# Patient Record
Sex: FEMALE | Race: WHITE | NOT HISPANIC OR LATINO | Employment: OTHER | URBAN - METROPOLITAN AREA
[De-identification: names, ages, dates, MRNs, and addresses within clinical notes are randomized per-mention and may not be internally consistent; named-entity substitution may affect disease eponyms.]

---

## 2017-06-02 ENCOUNTER — TRANSCRIBE ORDERS (OUTPATIENT)
Dept: ADMINISTRATIVE | Facility: HOSPITAL | Age: 67
End: 2017-06-02

## 2017-06-02 DIAGNOSIS — Z12.31 VISIT FOR SCREENING MAMMOGRAM: Primary | ICD-10-CM

## 2017-07-27 ENCOUNTER — HOSPITAL ENCOUNTER (OUTPATIENT)
Dept: MAMMOGRAPHY | Facility: HOSPITAL | Age: 67
Discharge: HOME | End: 2017-07-27
Attending: OBSTETRICS & GYNECOLOGY | Admitting: OBSTETRICS & GYNECOLOGY

## 2017-07-27 DIAGNOSIS — Z12.31 VISIT FOR SCREENING MAMMOGRAM: ICD-10-CM

## 2017-07-27 PROCEDURE — 77063 BREAST TOMOSYNTHESIS BI: CPT | Performed by: RADIOLOGY

## 2017-07-27 PROCEDURE — G0202 SCR MAMMO BI INCL CAD: HCPCS | Performed by: RADIOLOGY

## 2017-07-27 PROCEDURE — G0202 SCR MAMMO BI INCL CAD: HCPCS

## 2017-07-27 PROCEDURE — 77063 BREAST TOMOSYNTHESIS BI: CPT

## 2017-07-28 ENCOUNTER — APPOINTMENT (OUTPATIENT)
Dept: MAMMOGRAPHY | Facility: HOSPITAL | Age: 67
End: 2017-07-28
Attending: OBSTETRICS & GYNECOLOGY

## 2018-08-06 ENCOUNTER — TRANSCRIBE ORDERS (OUTPATIENT)
Dept: ADMINISTRATIVE | Facility: HOSPITAL | Age: 68
End: 2018-08-06

## 2018-08-06 DIAGNOSIS — Z12.31 VISIT FOR SCREENING MAMMOGRAM: Primary | ICD-10-CM

## 2018-09-11 ENCOUNTER — HOSPITAL ENCOUNTER (OUTPATIENT)
Dept: MAMMOGRAPHY | Facility: HOSPITAL | Age: 68
Discharge: HOME | End: 2018-09-11
Attending: OBSTETRICS & GYNECOLOGY | Admitting: OBSTETRICS & GYNECOLOGY

## 2018-09-11 DIAGNOSIS — Z12.31 VISIT FOR SCREENING MAMMOGRAM: ICD-10-CM

## 2018-09-11 PROCEDURE — 77063 BREAST TOMOSYNTHESIS BI: CPT | Performed by: RADIOLOGY

## 2018-09-11 PROCEDURE — 77067 SCR MAMMO BI INCL CAD: CPT

## 2018-09-11 PROCEDURE — 77063 BREAST TOMOSYNTHESIS BI: CPT

## 2018-09-11 PROCEDURE — 77067 SCR MAMMO BI INCL CAD: CPT | Performed by: RADIOLOGY

## 2019-08-07 ENCOUNTER — TRANSCRIBE ORDERS (OUTPATIENT)
Dept: ADMINISTRATIVE | Facility: HOSPITAL | Age: 69
End: 2019-08-07

## 2019-08-07 DIAGNOSIS — Z12.31 VISIT FOR SCREENING MAMMOGRAM: Primary | ICD-10-CM

## 2019-09-23 ENCOUNTER — HOSPITAL ENCOUNTER (OUTPATIENT)
Dept: MAMMOGRAPHY | Facility: HOSPITAL | Age: 69
Discharge: HOME | End: 2019-09-23
Admitting: OBSTETRICS & GYNECOLOGY

## 2019-09-23 DIAGNOSIS — Z12.31 VISIT FOR SCREENING MAMMOGRAM: ICD-10-CM

## 2019-09-23 PROCEDURE — 77063 BREAST TOMOSYNTHESIS BI: CPT

## 2019-09-23 PROCEDURE — 77067 SCR MAMMO BI INCL CAD: CPT

## 2019-09-23 PROCEDURE — 77063 BREAST TOMOSYNTHESIS BI: CPT | Performed by: RADIOLOGY

## 2019-09-23 PROCEDURE — 77067 SCR MAMMO BI INCL CAD: CPT | Performed by: RADIOLOGY

## 2019-10-21 ENCOUNTER — TRANSCRIBE ORDERS (OUTPATIENT)
Dept: MAMMOGRAPHY | Facility: HOSPITAL | Age: 69
End: 2019-10-21

## 2019-10-21 ENCOUNTER — HOSPITAL ENCOUNTER (OUTPATIENT)
Dept: ULTRASOUND IMAGING | Facility: HOSPITAL | Age: 69
Discharge: HOME | End: 2019-10-21

## 2019-10-21 ENCOUNTER — HOSPITAL ENCOUNTER (OUTPATIENT)
Dept: MAMMOGRAPHY | Facility: HOSPITAL | Age: 69
Discharge: HOME | End: 2019-10-21
Admitting: RADIOLOGY

## 2019-10-21 DIAGNOSIS — R92.8 ABNORMAL MAMMOGRAM: ICD-10-CM

## 2019-10-21 DIAGNOSIS — R92.8 ABNORMAL MAMMOGRAM: Primary | ICD-10-CM

## 2019-10-21 PROCEDURE — 77066 DX MAMMO INCL CAD BI: CPT

## 2019-10-21 PROCEDURE — 76642 ULTRASOUND BREAST LIMITED: CPT

## 2019-10-21 PROCEDURE — G0279 TOMOSYNTHESIS, MAMMO: HCPCS

## 2019-10-21 PROCEDURE — 77066 DX MAMMO INCL CAD BI: CPT | Performed by: RADIOLOGY

## 2019-10-21 PROCEDURE — 76642 ULTRASOUND BREAST LIMITED: CPT | Performed by: RADIOLOGY

## 2019-10-21 PROCEDURE — G0279 TOMOSYNTHESIS, MAMMO: HCPCS | Performed by: RADIOLOGY

## 2019-11-08 ENCOUNTER — HOSPITAL ENCOUNTER (OUTPATIENT)
Dept: MAMMOGRAPHY | Facility: HOSPITAL | Age: 69
Discharge: HOME | End: 2019-11-08

## 2019-11-08 ENCOUNTER — HOSPITAL ENCOUNTER (OUTPATIENT)
Dept: MAMMOGRAPHY | Facility: HOSPITAL | Age: 69
Discharge: HOME | End: 2019-11-08
Admitting: RADIOLOGY

## 2019-11-08 DIAGNOSIS — R92.8 ABNORMAL MAMMOGRAM: ICD-10-CM

## 2019-11-08 PROCEDURE — 25010000003 LIDOCAINE 1 % SOLUTION: Performed by: RADIOLOGY

## 2019-11-08 PROCEDURE — 88305 TISSUE EXAM BY PATHOLOGIST: CPT | Performed by: OBSTETRICS & GYNECOLOGY

## 2019-11-08 PROCEDURE — A4648 IMPLANTABLE TISSUE MARKER: HCPCS

## 2019-11-08 PROCEDURE — 19081 BX BREAST 1ST LESION STRTCTC: CPT | Performed by: RADIOLOGY

## 2019-11-08 PROCEDURE — 77065 DX MAMMO INCL CAD UNI: CPT | Performed by: RADIOLOGY

## 2019-11-08 RX ORDER — LIDOCAINE HYDROCHLORIDE 10 MG/ML
5 INJECTION, SOLUTION INFILTRATION; PERINEURAL ONCE
Status: COMPLETED | OUTPATIENT
Start: 2019-11-08 | End: 2019-11-08

## 2019-11-08 RX ORDER — LIDOCAINE HYDROCHLORIDE AND EPINEPHRINE 10; 10 MG/ML; UG/ML
10 INJECTION, SOLUTION INFILTRATION; PERINEURAL ONCE
Status: COMPLETED | OUTPATIENT
Start: 2019-11-08 | End: 2019-11-08

## 2019-11-08 RX ADMIN — LIDOCAINE HYDROCHLORIDE 1 ML: 10 INJECTION, SOLUTION INFILTRATION; PERINEURAL at 10:32

## 2019-11-08 RX ADMIN — LIDOCAINE HYDROCHLORIDE,EPINEPHRINE BITARTRATE 4 ML: 10; .01 INJECTION, SOLUTION INFILTRATION; PERINEURAL at 10:32

## 2019-11-11 ENCOUNTER — TELEPHONE (OUTPATIENT)
Dept: MAMMOGRAPHY | Facility: HOSPITAL | Age: 69
End: 2019-11-11

## 2019-11-11 LAB
CYTO UR: NORMAL
LAB AP CASE REPORT: NORMAL
LAB AP CLINICAL INFORMATION: NORMAL
LAB AP DIAGNOSIS COMMENT: NORMAL
PATH REPORT.FINAL DX SPEC: NORMAL
PATH REPORT.GROSS SPEC: NORMAL

## 2019-11-11 NOTE — TELEPHONE ENCOUNTER
Pt called in, requests Dr Reed for surgical consult. Pt will be notified when an appointment is scheduled.

## 2019-11-12 ENCOUNTER — TELEPHONE (OUTPATIENT)
Dept: MAMMOGRAPHY | Facility: HOSPITAL | Age: 69
End: 2019-11-12

## 2019-11-12 NOTE — TELEPHONE ENCOUNTER
Pt notified of pathology results and recommendations. Verbalizes understanding. Denies discomfort. Denies signs and symptoms of infection.   Patient desires Dr SONYA Reed for surgical consult. Patient notified of appointment on 11.25.19 @ 0845/0900. Told to bring photo ID, insurance cards & list of current medications. Pt verbalized understanding.

## 2019-11-25 ENCOUNTER — TRANSCRIBE ORDERS (OUTPATIENT)
Dept: MAMMOGRAPHY | Facility: HOSPITAL | Age: 69
End: 2019-11-25

## 2019-11-25 DIAGNOSIS — N60.91 BENIGN MAMMARY DYSPLASIA OF RIGHT BREAST: Primary | ICD-10-CM

## 2020-01-03 ENCOUNTER — HOSPITAL ENCOUNTER (OUTPATIENT)
Dept: MAMMOGRAPHY | Facility: HOSPITAL | Age: 70
Discharge: HOME | End: 2020-01-03

## 2020-01-03 ENCOUNTER — HOSPITAL ENCOUNTER (OUTPATIENT)
Dept: MAMMOGRAPHY | Facility: HOSPITAL | Age: 70
Discharge: HOME | End: 2020-01-03
Admitting: SURGERY

## 2020-01-03 ENCOUNTER — LAB REQUISITION (OUTPATIENT)
Dept: LAB | Facility: HOSPITAL | Age: 70
End: 2020-01-03

## 2020-01-03 DIAGNOSIS — N60.91 BENIGN MAMMARY DYSPLASIA OF RIGHT BREAST: ICD-10-CM

## 2020-01-03 DIAGNOSIS — N60.91 UNSPECIFIED BENIGN MAMMARY DYSPLASIA OF RIGHT BREAST: ICD-10-CM

## 2020-01-03 PROCEDURE — 77065 DX MAMMO INCL CAD UNI: CPT | Performed by: RADIOLOGY

## 2020-01-03 PROCEDURE — 88307 TISSUE EXAM BY PATHOLOGIST: CPT | Performed by: SURGERY

## 2020-01-03 PROCEDURE — 76098 X-RAY EXAM SURGICAL SPECIMEN: CPT

## 2020-01-03 PROCEDURE — 19283 PERQ DEV BREAST 1ST STRTCTC: CPT | Performed by: RADIOLOGY

## 2020-01-03 PROCEDURE — 76098 X-RAY EXAM SURGICAL SPECIMEN: CPT | Performed by: RADIOLOGY

## 2020-01-03 RX ORDER — LIDOCAINE HYDROCHLORIDE 10 MG/ML
5 INJECTION, SOLUTION INFILTRATION; PERINEURAL ONCE
Status: SHIPPED | OUTPATIENT
Start: 2020-01-03

## 2020-01-06 LAB
CYTO UR: NORMAL
LAB AP CASE REPORT: NORMAL
LAB AP CLINICAL INFORMATION: NORMAL
PATH REPORT.FINAL DX SPEC: NORMAL
PATH REPORT.GROSS SPEC: NORMAL

## 2020-01-15 ENCOUNTER — TRANSCRIBE ORDERS (OUTPATIENT)
Dept: MAMMOGRAPHY | Facility: HOSPITAL | Age: 70
End: 2020-01-15

## 2020-01-15 DIAGNOSIS — N60.99 BENIGN MAMMARY DYSPLASIA, UNSPECIFIED LATERALITY: Primary | ICD-10-CM

## 2020-02-05 ENCOUNTER — CLINICAL SUPPORT (OUTPATIENT)
Dept: GENETICS | Facility: HOSPITAL | Age: 70
End: 2020-02-05

## 2020-02-05 ENCOUNTER — APPOINTMENT (OUTPATIENT)
Dept: LAB | Facility: HOSPITAL | Age: 70
End: 2020-02-05

## 2020-02-05 DIAGNOSIS — D05.00 LOBULAR CARCINOMA IN SITU (LCIS) OF BREAST, UNSPECIFIED LATERALITY: Primary | ICD-10-CM

## 2020-02-05 DIAGNOSIS — N60.99 ATYPICAL HYPERPLASIA OF BREAST: ICD-10-CM

## 2020-02-05 DIAGNOSIS — Z80.42 FAMILY HISTORY OF PROSTATE CANCER: ICD-10-CM

## 2020-02-05 DIAGNOSIS — Z80.41 FAMILY HISTORY OF OVARIAN CANCER: ICD-10-CM

## 2020-02-05 DIAGNOSIS — Z13.79 GENETIC TESTING: ICD-10-CM

## 2020-02-05 DIAGNOSIS — Z13.79 GENETIC TESTING: Primary | ICD-10-CM

## 2020-02-05 DIAGNOSIS — Z80.3 FAMILY HISTORY OF BREAST CANCER: ICD-10-CM

## 2020-02-05 PROCEDURE — 96040: CPT | Performed by: GENETIC COUNSELOR, MS

## 2020-02-05 NOTE — PROGRESS NOTES
Jazmyne Yang is a 69-year-old female who was seen for genetic counseling due to a personal history of lobular carcinoma in-situ (LCIS), atypical ductal hyperplasia (ADH) and atypical lobular hyperplasia (ALH) and family history of breast, ovarian and prostate cancer.  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  Ms. Yang was 14 years old at menarche and had her first child at 23.  She had a STACIE-BSO and is postmenopausal. She was on HRT from 1998 until 2019.  She was interested in discussing her risk to develop breast cancer and the likelihood for a hereditary cancer syndrome. Ms. Yang was interested in pursuing genetic testing, and therefore, the [facility] Cancer Panel was ordered which analyzes 47 genes associated with an increased cancer risk. Results from this testing are expected in approximately 2-3 weeks.    FAMILY HISTORY (see attached pedigree):    Mother:  Stomach cancer, 61  Mat. Aunt:  Brain cancer, 45  Mat. Aunt:  Breast cancer and Lung cancer, unknown  Mat. Aunt:  Lung cancer, unknown  Mat. Aunt:  Breast cancer, 71  Pat. Aunt:  Breast cancer, 63, Breast bilateral, 77  Pat. Aunt:  Breast cancer, 66, Lung cancer, unknown  Pat. Uncle:  Prostate cancer, unknown  Pat. Grandmother:  Gall bladder cancer, 90  Pat. First cousin:  Ovarian cancer, 43    We do not have medical records confirming the diagnoses in Ms. Yang’s family.    RISK ASSESSMENT:  Having been diagnosed with LCIS, ALH and ADH Ms. Madrid has an increased lifetime risk for breast cancer. Ms. Yang’s lifetime risk of developing breast cancer is 35.5% based on her breast disease and other risk factors (Azar). Additionally, Ms. Yang’s family history of breast, ovarian and prostate cancer led to concern regarding hereditary breast and ovarian cancer syndrome.  She meets NCCN guidelines criteria for BRCA1/2 testing. Ms. Yang opted to pursue multigene panel  testing through [facility]. If genetic testing comes back negative, risk modeling will be utilized to estimate her lifetime breast cancer risk based on her family history and personal history of LCIS, ALH and ADH. Based on this information, regardless of her genetic test results, she would qualify for increased breast screening moving forward.  Increased screening is recommended for anyone who has a lifetime breast cancer risk greater than 20%.  This risk assessment is based on the family history information provided at the time of the appointment.  The assessment could change in the future should new information be obtained.    GENETIC COUNSELING (40 minutes):  We reviewed the family history information in detail. Cases of cancer follow three general patterns: sporadic, familial, and hereditary.  While most cancer is sporadic, some cases appear to occur in family clusters.  These cases are said to be familial and account for 10-20% of cancer cases.  Familial cases may be due to a combination of shared genes and environmental factors among family members.  In even fewer families, the cancer is said to be inherited, and the genes responsible for the cancer are known.      Family histories typical of hereditary cancer syndromes usually include multiple first- and second-degree relatives diagnosed with cancer types that define a syndrome.  These cases tend to be diagnosed at younger-than-expected ages and can be bilateral or multifocal.  The cancer in these families follows an autosomal dominant inheritance pattern, which indicates the likely presence of a mutation in a cancer susceptibility gene.  Children and siblings of an individual believed to carry this mutation have a 50% chance of inheriting that mutation, thereby inheriting the increased risk to develop cancer.  These mutations can be passed down from the maternal or the paternal lineage.    Based on Ms. Yang’s family history of breast cancer, we discussed that  hereditary breast cancer accounts for 5-10% of all cases of breast cancer.  A significant proportion of hereditary breast cancer can be attributed to mutations in the BRCA1 and BRCA2 genes.  Mutations in these genes confer an increased risk for breast cancer, ovarian cancer, male breast cancer, prostate cancer, and pancreatic cancer.  Women with a BRCA1 or BRCA2 mutation have up to an 87% lifetime risk of breast cancer and up to a 44% risk of ovarian cancer. We discussed that there are other, more rare, hereditary cancer syndromes, and genes that cause more moderate risk increases. Based on Ms. Yang’s family history and her desire to get more information regarding her personal risks she opted to pursue testing through a panel evaluating several other genes known to increase the risk for cancer.    GENETIC TESTING:  The risks, benefits and limitations of genetic testing and implications for clinical management following testing were reviewed. DNA test results can influence decisions regarding screening and prevention.  Genetic testing can have significant psychological implications for both individuals and families. Also discussed was the possibility of employment and insurance discrimination based on genetic test results and the federal and states laws that are in place to prevent this.         We discussed panel testing, which would involve testing 47 genes associated with increased cancer risk. The benefits and limitations of genetic testing were discussed.  The implications of a positive or negative test result were discussed.  We also discussed the importance of testing on an affected relative. We discussed the possibility that, in some cases, genetic test results may be ambiguous due to the identification of a genetic variant. These variants may or may not be associated with an increased cancer risk. With multigene panel testing, it is not uncommon for a variant of uncertain significance (VUS) to be  identified.  If a VUS is identified, testing family members is not recommended and screening recommendations are made based on the family history.  The laboratories that perform genetic testing work to reclassify the VUS and send out an amended report if and when a VUS is reclassified.  The majority of variant findings are ultimately reclassified to a negative result. Given her family history, a negative test result does not eliminate all cancer risk, although the risk would not be as high as it would with positive genetic testing.     PLAN:  Genetic testing via the [facility] Cancer Panel was ordered and results are expected in 2-3 weeks. We will contact MsBandar Celia with her results once they are received.      Anny Varela MS, [facility] [place]  Licensed Certified Genetic Counselor

## 2020-02-07 ENCOUNTER — TRANSCRIBE ORDERS (OUTPATIENT)
Dept: ADMINISTRATIVE | Facility: HOSPITAL | Age: 70
End: 2020-02-07

## 2020-02-07 DIAGNOSIS — R22.1 NODULE OF NECK: Primary | ICD-10-CM

## 2020-02-11 ENCOUNTER — HOSPITAL ENCOUNTER (OUTPATIENT)
Dept: ULTRASOUND IMAGING | Facility: HOSPITAL | Age: 70
Discharge: HOME | End: 2020-02-11
Admitting: OBSTETRICS & GYNECOLOGY

## 2020-02-11 DIAGNOSIS — R22.1 NODULE OF NECK: ICD-10-CM

## 2020-02-11 PROCEDURE — 76536 US EXAM OF HEAD AND NECK: CPT

## 2020-02-12 ENCOUNTER — DOCUMENTATION (OUTPATIENT)
Dept: GENETICS | Facility: HOSPITAL | Age: 70
End: 2020-02-12

## 2020-02-12 NOTE — PROGRESS NOTES
Jazmyne Yang is a 69-year-old female who was seen for genetic counseling due to a personal history of lobular carcinoma in-situ (LCIS), atypical ductal hyperplasia (ADH) and atypical lobular hyperplasia (ALH) and family history of breast, ovarian and prostate cancer. Ms. Yang was 14 years old at menarche and had her first child at 23.  She had a STACIE-BSO and is postmenopausal. She was on HRT from 1998 until 2019.  She was interested in discussing her risk to develop breast cancer and the likelihood for a hereditary cancer syndrome. Ms. Yang was interested in pursuing genetic testing, and therefore, the [facility] Cancer Panel was ordered which analyzes 47 genes associated with an increased cancer risk. Genetic testing was negative by sequencing and rearrangement testing for deleterious mutations in the 47 genes included on the [facility] panel (see attached results). These normal results were discussed with Ms. Yang by telephone on 02/12/20.    FAMILY HISTORY (see attached pedigree):    Mother:  Stomach cancer, 61  Mat. Aunt:  Brain cancer, 45  Mat. Aunt:  Breast cancer and Lung cancer  Mat. Aunt:  Lung cancer, unknown  Mat. Aunt:  Breast cancer, 71  Pat. Aunt:  Breast cancer, 63, Breast bilateral, 77  Pat. Aunt:  Breast cancer, 66,      Lung cancer  Pat. Uncle:  Prostate cancer  Pat. Grandmother:  Gall bladder cancer, 90  Pat. First cousin:  Ovarian cancer, 43    We do not have medical records confirming the diagnoses in Ms. Yang’s family.    RISK ASSESSMENT:  Having been diagnosed with LCIS, ALH and ADH Ms. Madrid has an increased lifetime risk for breast cancer. Additionally, Ms. Yang’s family history of breast, ovarian and prostate cancer led to concern regarding hereditary breast and ovarian cancer syndrome.  She meets NCCN guidelines criteria for BRCA1/2 testing. Ms. Yang opted to pursue multigene panel testing through [facility]. If genetic testing comes back negative, risk modeling will be utilized to  estimate her lifetime breast cancer risk based on her family history and personal history of LCIS, ALH and ADH. Based on this information, regardless of her genetic test results, she would qualify for increased breast screening moving forward.      Risk models show that Ms. Yang’s lifetime breast cancer risk is up to 35.5% (GIOVANNI/Deo-Shawna) based on her family history and personal history of ALH, ADH, and LCIS.  Increased screening and consideration of chemoprevention is recommended for anyone who has a lifetime breast cancer risk greater than 20%.  This risk assessment is based on the family history information provided at the time of the appointment and could change in the future should new information be obtained.    GENETIC COUNSELING:  We reviewed the family history information in detail. Cases of cancer follow three general patterns: sporadic, familial, and hereditary.  While most cancer is sporadic, some cases appear to occur in family clusters.  These cases are said to be familial and account for 10-20% of cancer cases.  Familial cases may be due to a combination of shared genes and environmental factors among family members.  In even fewer families, the cancer is said to be inherited, and the genes responsible for the cancer are known.      Family histories typical of hereditary cancer syndromes usually include multiple first- and second-degree relatives diagnosed with cancer types that define a syndrome.  These cases tend to be diagnosed at younger-than-expected ages and can be bilateral or multifocal.  The cancer in these families follows an autosomal dominant inheritance pattern, which indicates the likely presence of a mutation in a cancer susceptibility gene.  Children and siblings of an individual believed to carry this mutation have a 50% chance of inheriting that mutation, thereby inheriting the increased risk to develop cancer.  These mutations can be passed down from the maternal or the  paternal lineage.    Based on Ms. Yang’s family history of breast cancer, we discussed that hereditary breast cancer accounts for 5-10% of all cases of breast cancer.  A significant proportion of hereditary breast cancer can be attributed to mutations in the BRCA1 and BRCA2 genes.  Mutations in these genes confer an increased risk for breast cancer, ovarian cancer, male breast cancer, prostate cancer, and pancreatic cancer.  Women with a BRCA1 or BRCA2 mutation have up to an 87% lifetime risk of breast cancer and up to a 44% risk of ovarian cancer. We discussed that there are other, more rare, hereditary cancer syndromes, and genes that cause more moderate risk increases. Based on Ms. Yang’s family history and her desire to get more information regarding her personal risks she opted to pursue testing through a panel evaluating several other genes known to increase the risk for cancer.    GENETIC TESTING:  The risks, benefits and limitations of genetic testing and implications for clinical management following testing were reviewed. DNA test results can influence decisions regarding screening and prevention.  Genetic testing can have significant psychological implications for both individuals and families. Also discussed was the possibility of employment and insurance discrimination based on genetic test results and the federal and states laws that are in place to prevent this.         We discussed panel testing, which would involve testing 47 genes associated with hereditary cancer risk. The benefits and limitations of genetic testing were discussed.  The implications of a positive or negative test result were discussed.  We also discussed the importance of testing on an affected relative. We discussed the possibility that, in some cases, genetic test results may be ambiguous due to the identification of a genetic variant. These variants may or may not be associated with an increased cancer risk. With multigene  panel testing, it is not uncommon for a variant of uncertain significance (VUS) to be identified.  The laboratories that perform genetic testing work to reclassify the VUS and send out an amended report if and when a VUS is reclassified.  The majority of variant findings are ultimately reclassified to a negative result. Given her family history, a negative test result does not eliminate all cancer risk, although the risk would not be as high as it would with positive genetic testing.     TEST RESULTS: Genetic testing was negative for known deleterious mutations by sequencing and rearrangement testing for the genes on the [facility] panel.  This negative result greatly lowers the risk of a hereditary cancer syndrome for Ms. Yang.  This assessment is based on the information provided at the time of the consultation.    CLINICAL MANAGEMENT GUIDELINES: Options available to individuals with a high lifetime risk for breast cancer were discussed, including increased surveillance, chemoprevention and prophylactic surgery.  Given her personal history of LCIS, ADH and ALH in combination with her family history of breast cancer, Ms. Yang is at an increased lifetime risk for breast cancer and increased surveillance and chemoprevention are likely warranted.      Increased surveillance, based on NCCN guidelines, would consist of semi-annual clinical breast exam and monthly self-breast exam starting by age 18 and annual mammography.  According to an American Cancer Society expert panel and NCCN guidelines, annual breast MRI should be offered to women whose lifetime risk of breast cancer is 20-25 percent or more.  Breast cancer chemoprevention is another option that Ms. Yang may wish to consider.  Studies have shown that Tamoxifen and Raloxifene can cut the risk of estrogen receptor positive breast cancer by 50% when taken by high-risk women over a 5-year period, and reduced the estrogen receptor positive breast cancer risk  by as much as 86% when taken by women who have had atypical hyperplasia identified.  NCCN guidelines state that chemoprevention should be strongly recommended for women with atypical hyperplasia and LCIS.  There are risks and side effects associated with these medications; therefore, the risks versus benefits must be considered. This assessment is based on the information provided at today’s appointment.    PLAN: Genetic counseling remains available to Ms. Yang.  We discussed the availability of the Cancer Risk Management Clinic, which takes place through the GYN Oncology office at [facility].  The purpose of the clinic is to coordinate increased screening and prescribe/manage chemoprevention as needed for high risk patients.  Ms. Yang wanted to discuss this information with her physicians at this time.  If Ms. Yang has any questions or concerns, or she would like to be referred to the Cancer Risk Management Clinic, she is welcome to call me at [phone].      Anny Varela MS, [facility] [place] [place]  Licensed Certified Genetic Counselor    Cc: JazmyneMD Dori Prasad MD

## 2020-07-16 ENCOUNTER — HOSPITAL ENCOUNTER (OUTPATIENT)
Dept: MAMMOGRAPHY | Facility: HOSPITAL | Age: 70
Discharge: HOME | End: 2020-07-16
Admitting: SURGERY

## 2020-07-16 DIAGNOSIS — N60.99 BENIGN MAMMARY DYSPLASIA, UNSPECIFIED LATERALITY: ICD-10-CM

## 2020-07-16 PROCEDURE — G0279 TOMOSYNTHESIS, MAMMO: HCPCS | Performed by: RADIOLOGY

## 2020-07-16 PROCEDURE — G0279 TOMOSYNTHESIS, MAMMO: HCPCS

## 2020-07-16 PROCEDURE — 77065 DX MAMMO INCL CAD UNI: CPT | Performed by: RADIOLOGY

## 2020-07-16 PROCEDURE — 77065 DX MAMMO INCL CAD UNI: CPT

## 2020-07-27 ENCOUNTER — TRANSCRIBE ORDERS (OUTPATIENT)
Dept: ADMINISTRATIVE | Facility: HOSPITAL | Age: 70
End: 2020-07-27

## 2020-07-27 DIAGNOSIS — Z12.31 VISIT FOR SCREENING MAMMOGRAM: Primary | ICD-10-CM

## 2020-10-20 ENCOUNTER — HOSPITAL ENCOUNTER (OUTPATIENT)
Dept: MAMMOGRAPHY | Facility: HOSPITAL | Age: 70
Discharge: HOME | End: 2020-10-20
Admitting: OBSTETRICS & GYNECOLOGY

## 2020-10-20 DIAGNOSIS — Z12.31 VISIT FOR SCREENING MAMMOGRAM: ICD-10-CM

## 2020-10-20 PROCEDURE — 77063 BREAST TOMOSYNTHESIS BI: CPT

## 2020-10-20 PROCEDURE — 77063 BREAST TOMOSYNTHESIS BI: CPT | Performed by: RADIOLOGY

## 2020-10-20 PROCEDURE — 77067 SCR MAMMO BI INCL CAD: CPT | Performed by: RADIOLOGY

## 2020-10-20 PROCEDURE — 77067 SCR MAMMO BI INCL CAD: CPT

## 2021-02-02 ENCOUNTER — OFFICE VISIT (OUTPATIENT)
Dept: OBSTETRICS AND GYNECOLOGY | Facility: CLINIC | Age: 71
End: 2021-02-02

## 2021-02-02 VITALS
DIASTOLIC BLOOD PRESSURE: 90 MMHG | SYSTOLIC BLOOD PRESSURE: 130 MMHG | WEIGHT: 194.2 LBS | HEIGHT: 66 IN | BODY MASS INDEX: 31.21 KG/M2

## 2021-02-02 DIAGNOSIS — Z78.0 OSTEOPENIA AFTER MENOPAUSE: ICD-10-CM

## 2021-02-02 DIAGNOSIS — F41.1 GAD (GENERALIZED ANXIETY DISORDER): ICD-10-CM

## 2021-02-02 DIAGNOSIS — D05.11 DUCTAL CARCINOMA IN SITU (DCIS) OF RIGHT BREAST: ICD-10-CM

## 2021-02-02 DIAGNOSIS — Z91.89 AT HIGH RISK FOR BREAST CANCER: ICD-10-CM

## 2021-02-02 DIAGNOSIS — Z01.419 WOMEN'S ANNUAL ROUTINE GYNECOLOGICAL EXAMINATION: Primary | ICD-10-CM

## 2021-02-02 DIAGNOSIS — M85.80 OSTEOPENIA AFTER MENOPAUSE: ICD-10-CM

## 2021-02-02 DIAGNOSIS — Z12.39 ENCOUNTER FOR BREAST CANCER SCREENING USING NON-MAMMOGRAM MODALITY: ICD-10-CM

## 2021-02-02 DIAGNOSIS — Z90.710 STATUS POST HYSTERECTOMY: ICD-10-CM

## 2021-02-02 DIAGNOSIS — N60.91 ATYPICAL DUCTAL HYPERPLASIA OF RIGHT BREAST: ICD-10-CM

## 2021-02-02 DIAGNOSIS — M25.559 HIP PAIN: ICD-10-CM

## 2021-02-02 DIAGNOSIS — G47.00 INSOMNIA, UNSPECIFIED TYPE: ICD-10-CM

## 2021-02-02 DIAGNOSIS — Z13.820 OSTEOPOROSIS SCREENING: ICD-10-CM

## 2021-02-02 PROCEDURE — G0101 CA SCREEN;PELVIC/BREAST EXAM: HCPCS | Performed by: OBSTETRICS & GYNECOLOGY

## 2021-02-02 PROCEDURE — 99214 OFFICE O/P EST MOD 30 MIN: CPT | Performed by: OBSTETRICS & GYNECOLOGY

## 2021-02-02 RX ORDER — LOSARTAN POTASSIUM 50 MG/1
TABLET ORAL
COMMUNITY
Start: 2020-05-29

## 2021-02-02 RX ORDER — TEMAZEPAM 7.5 MG/1
7.5 CAPSULE ORAL NIGHTLY PRN
Qty: 30 CAPSULE | Refills: 0 | Status: SHIPPED | OUTPATIENT
Start: 2021-02-02 | End: 2022-02-22

## 2021-02-02 RX ORDER — VENLAFAXINE HYDROCHLORIDE 75 MG/1
75 CAPSULE, EXTENDED RELEASE ORAL DAILY
Qty: 90 CAPSULE | Refills: 3 | Status: SHIPPED | OUTPATIENT
Start: 2021-02-02 | End: 2021-02-02

## 2021-02-02 RX ORDER — VENLAFAXINE HYDROCHLORIDE 75 MG/1
75 CAPSULE, EXTENDED RELEASE ORAL DAILY
Qty: 90 CAPSULE | Refills: 3 | Status: SHIPPED | OUTPATIENT
Start: 2021-02-02 | End: 2022-04-14 | Stop reason: SDUPTHER

## 2021-02-02 NOTE — PROGRESS NOTES
GYN Annual Exam     CC - Here for annual exam.     Subjective   HPI  Jazmyne Yang is a 70 y.o. female, , who presents for annual well woman exam.  She is postmenopausal. Her last LMP was No LMP recorded. Patient has had a hysterectomy..    Patient reports problems with: anxiety and hot flashes.  Partner Status: Marital Status: .  New Partners since last visit: no Desires STD Screening: no    She reports having increased pain with her right hip. She would like to discuss being on anxiety medication due to current life events.     Patient reports that she is not currently experiencing any symptoms of urinary incontinence.      Last mammogram: 10/20/2020  Last Completed Mammogram       Status Date      MAMMOGRAM Done 10/20/2020 MAMMO SCREENING DIGITAL TOMOSYNTHESIS BILATERAL W CAD     Patient has more history with this topic...        Last colonoscopy:  normal  Last Completed Colonoscopy       Status Date      COLONOSCOPY Done 2014 normal        Last DEXA: 2018, Osteopenia   Last Pap : 2019  Last Completed Pap Smear       Status Date      PAP SMEAR Done 2019 Negative        History of abnormal Pap smear: no    Additional OB/GYN History   Current contraception: contraceptive methods: Post menopausal status  Desires to: do not start contraception  Family history of uterine, colon, breast, or ovarian cancer: yes - Maternal and paternal aunt had breast cancer, Paternal uncle had colon cancer  Performs monthly Self-Breast Exam: yes  Parental Hip Fracture: No  Exercises Regularly: no  Feelings of Anxiety or Depression: yes - Anxiety and depression    Tobacco Usage?: No   OB History        2    Para   2    Term   2       0    AB   0    Living   2       SAB        TAB        Ectopic        Molar        Multiple        Live Births                    Health Maintenance   Topic Date Due   • ZOSTER VACCINE (1 of 2) 2001   • Pneumococcal Vaccine 65+ (1 of 1 - PPSV23)  "06/11/2016   • HEPATITIS C SCREENING  02/05/2020   • ANNUAL WELLNESS VISIT  02/05/2020   • INFLUENZA VACCINE  08/01/2020   • PAP SMEAR  01/08/2021   • MAMMOGRAM  10/20/2021   • COLONOSCOPY  01/01/2024   • TDAP/TD VACCINES (2 - Td) 09/14/2027   • MENINGOCOCCAL VACCINE  Aged Out       The additional following portions of the patient's history were reviewed and updated as appropriate: allergies, current medications, past family history, past medical history, past social history, past surgical history and problem list.    Review of Systems   Constitutional: Negative.    HENT: Negative.    Eyes: Negative.    Respiratory: Negative.    Cardiovascular: Negative.    Gastrointestinal: Negative.    Endocrine: Negative.    Genitourinary: Negative.    Musculoskeletal: Negative.    Skin: Negative.    Allergic/Immunologic: Negative.    Neurological: Negative.    Hematological: Negative.    Psychiatric/Behavioral: Positive for depressed mood. The patient is nervous/anxious.        I have reviewed and agree with the HPI, ROS, and historical information as entered above. Frida Min MD    Objective   /90   Ht 166.4 cm (65.5\")   Wt 88.1 kg (194 lb 3.2 oz)   Breastfeeding No   BMI 31.83 kg/m²     Physical Exam  Vitals signs and nursing note reviewed. Exam conducted with a chaperone present.   Constitutional:       Appearance: She is well-developed.   HENT:      Head: Normocephalic and atraumatic.   Neck:      Musculoskeletal: Normal range of motion. No muscular tenderness.      Thyroid: No thyroid mass or thyromegaly.   Cardiovascular:      Rate and Rhythm: Normal rate and regular rhythm.      Heart sounds: No murmur.   Pulmonary:      Effort: Pulmonary effort is normal. No retractions.      Breath sounds: Normal breath sounds. No wheezing, rhonchi or rales.   Chest:      Chest wall: No mass or tenderness.      Breasts:         Right: Normal. No mass, nipple discharge, skin change or tenderness.         Left: Normal. No " mass, nipple discharge, skin change or tenderness.   Abdominal:      General: Bowel sounds are normal.      Palpations: Abdomen is soft. Abdomen is not rigid. There is no mass.      Tenderness: There is no abdominal tenderness. There is no guarding.      Hernia: No hernia is present. There is no hernia in the left inguinal area or right inguinal area.   Genitourinary:     General: Normal vulva.      Exam position: Lithotomy position.      Pubic Area: No rash.       Labia:         Right: No rash, tenderness or lesion.         Left: No rash, tenderness or lesion.       Urethra: No urethral pain or urethral swelling.      Vagina: Normal. No vaginal discharge or lesions.      Uterus: Absent.       Adnexa:         Right: No mass, tenderness or fullness.          Left: No mass, tenderness or fullness.        Rectum: No external hemorrhoid.      Comments: Cervix surgically absent.  Vaginal cuff intact.  Neurological:      Mental Status: She is alert and oriented to person, place, and time.   Psychiatric:         Behavior: Behavior normal.           Assessment/Plan     Encounter Diagnoses   Name Primary?   • Women's annual routine gynecological examination Yes   • Status post hysterectomy    • Encounter for breast cancer screening using non-mammogram modality    • KAYLEIGH (generalized anxiety disorder)    • Hip pain    • Osteoporosis screening    • Atypical ductal hyperplasia of right breast    • At high risk for breast cancer    • Ductal carcinoma in situ (DCIS) of right breast     • Insomnia, unspecified type    • Osteopenia after menopause        Recommended use of Vitamin D replacement and getting adequate calcium in her diet. (1500mg)  BDS next visit.  Reviewed monthly self breast exams.  Instructed to call with lumps, pain, or breast discharge.    Continue yearly mammography and with h/o atypical ductal hyperplasia Dr. March has rec. Yearly intermediate risk MRI.  Ordered today.  She is doing well off oral and vaginal  estrogen.  Reviewed HPV guidelines.  Reviewed exercise as a preventative health measures.   KAYLEIGH - she has had generalized increased mood issues and situational anxiety. Discussed options and strongly encouraged her to try the Effexor she was given last year by her PCP.  It may help with both hot flashes and her anxiety/depression    Frida Min MD   02/02/2021

## 2021-05-17 ENCOUNTER — HOSPITAL ENCOUNTER (OUTPATIENT)
Dept: MRI IMAGING | Facility: HOSPITAL | Age: 71
Discharge: HOME | End: 2021-05-17
Admitting: OBSTETRICS & GYNECOLOGY

## 2021-05-17 DIAGNOSIS — N60.91 ATYPICAL DUCTAL HYPERPLASIA OF RIGHT BREAST: ICD-10-CM

## 2021-05-17 DIAGNOSIS — Z91.89 AT HIGH RISK FOR BREAST CANCER: ICD-10-CM

## 2021-05-17 DIAGNOSIS — D05.11 DUCTAL CARCINOMA IN SITU (DCIS) OF RIGHT BREAST: ICD-10-CM

## 2021-05-17 LAB — CREAT BLDA-MCNC: 0.8 MG/DL (ref 0.6–1.3)

## 2021-05-17 PROCEDURE — A9577 INJ MULTIHANCE: HCPCS | Performed by: OBSTETRICS & GYNECOLOGY

## 2021-05-17 PROCEDURE — C8937 CAD BREAST MRI: HCPCS

## 2021-05-17 PROCEDURE — 82565 ASSAY OF CREATININE: CPT

## 2021-05-17 PROCEDURE — C8908 MRI W/O FOL W/CONT, BREAST,: HCPCS

## 2021-05-17 PROCEDURE — 77049 MRI BREAST C-+ W/CAD BI: CPT | Performed by: RADIOLOGY

## 2021-05-17 PROCEDURE — 0 GADOBENATE DIMEGLUMINE 529 MG/ML SOLUTION: Performed by: OBSTETRICS & GYNECOLOGY

## 2021-05-17 RX ADMIN — GADOBENATE DIMEGLUMINE 18 ML: 529 INJECTION, SOLUTION INTRAVENOUS at 12:38

## 2021-09-22 ENCOUNTER — TRANSCRIBE ORDERS (OUTPATIENT)
Dept: ADMINISTRATIVE | Facility: HOSPITAL | Age: 71
End: 2021-09-22

## 2021-09-22 DIAGNOSIS — Z12.31 VISIT FOR SCREENING MAMMOGRAM: Primary | ICD-10-CM

## 2021-10-28 ENCOUNTER — APPOINTMENT (OUTPATIENT)
Dept: MAMMOGRAPHY | Facility: HOSPITAL | Age: 71
End: 2021-10-28

## 2021-11-10 ENCOUNTER — APPOINTMENT (OUTPATIENT)
Dept: MAMMOGRAPHY | Facility: HOSPITAL | Age: 71
End: 2021-11-10

## 2021-12-22 ENCOUNTER — HOSPITAL ENCOUNTER (OUTPATIENT)
Dept: MAMMOGRAPHY | Facility: HOSPITAL | Age: 71
Discharge: HOME | End: 2021-12-22
Admitting: OBSTETRICS & GYNECOLOGY

## 2021-12-22 DIAGNOSIS — Z12.31 VISIT FOR SCREENING MAMMOGRAM: ICD-10-CM

## 2021-12-22 PROCEDURE — 77063 BREAST TOMOSYNTHESIS BI: CPT

## 2021-12-22 PROCEDURE — 77063 BREAST TOMOSYNTHESIS BI: CPT | Performed by: RADIOLOGY

## 2021-12-22 PROCEDURE — 77067 SCR MAMMO BI INCL CAD: CPT

## 2021-12-22 PROCEDURE — 77067 SCR MAMMO BI INCL CAD: CPT | Performed by: RADIOLOGY

## 2022-02-22 ENCOUNTER — OFFICE VISIT (OUTPATIENT)
Dept: OBSTETRICS AND GYNECOLOGY | Facility: CLINIC | Age: 72
End: 2022-02-22

## 2022-02-22 VITALS
WEIGHT: 190 LBS | SYSTOLIC BLOOD PRESSURE: 126 MMHG | BODY MASS INDEX: 31.65 KG/M2 | DIASTOLIC BLOOD PRESSURE: 88 MMHG | HEIGHT: 65 IN

## 2022-02-22 DIAGNOSIS — Z12.31 ENCOUNTER FOR SCREENING MAMMOGRAM FOR MALIGNANT NEOPLASM OF BREAST: ICD-10-CM

## 2022-02-22 DIAGNOSIS — D05.11 DUCTAL CARCINOMA IN SITU (DCIS) OF RIGHT BREAST: ICD-10-CM

## 2022-02-22 DIAGNOSIS — N39.3 SUI (STRESS URINARY INCONTINENCE, FEMALE): ICD-10-CM

## 2022-02-22 DIAGNOSIS — Z90.710 STATUS POST HYSTERECTOMY: Primary | ICD-10-CM

## 2022-02-22 DIAGNOSIS — Z01.419 WOMEN'S ANNUAL ROUTINE GYNECOLOGICAL EXAMINATION: ICD-10-CM

## 2022-02-22 DIAGNOSIS — Z12.39 ENCOUNTER FOR BREAST CANCER SCREENING USING NON-MAMMOGRAM MODALITY: ICD-10-CM

## 2022-02-22 DIAGNOSIS — Z78.0 POSTMENOPAUSAL STATUS: ICD-10-CM

## 2022-02-22 PROCEDURE — G0101 CA SCREEN;PELVIC/BREAST EXAM: HCPCS | Performed by: OBSTETRICS & GYNECOLOGY

## 2022-02-22 RX ORDER — CLOBETASOL PROPIONATE 0.5 MG/G
CREAM TOPICAL
COMMUNITY
Start: 2022-01-27 | End: 2023-03-01

## 2022-02-22 RX ORDER — METRONIDAZOLE 250 MG/1
250 TABLET ORAL EVERY 8 HOURS
COMMUNITY
Start: 2021-12-01 | End: 2023-03-01

## 2022-02-22 RX ORDER — MELOXICAM 7.5 MG/1
7.5 TABLET ORAL DAILY
COMMUNITY
Start: 2022-02-07 | End: 2023-03-01

## 2022-02-22 RX ORDER — IBANDRONATE SODIUM 150 MG/1
150 TABLET, FILM COATED ORAL
Qty: 12 TABLET | Refills: 12 | Status: SHIPPED | OUTPATIENT
Start: 2022-02-22 | End: 2023-03-01 | Stop reason: SDUPTHER

## 2022-02-22 NOTE — PROGRESS NOTES
GYN Annual Exam     CC - Here for annual exam.   Refill Effexor     Subjective   HPI  Jazmyne Yang is a 71 y.o. female, , who presents for annual well woman exam.  She is postmenopausal.  No LMP recorded. Patient has had a hysterectomy.  Also with BSO age 48.Patient reports problems with: leaking urine.  Partner Status: Marital Status: .  New Partners since last visit: no Desires STD Screening: no    Last mammogram: she has a personal hx of DCIS right breast - pt had breast MRI  Last Completed Mammogram          MAMMOGRAM (Yearly) Order placed this encounter    2021  Mammo Screening Digital Tomosynthesis Bilateral With CAD    2021  MRI Breast Bilateral With & Without Contrast    10/20/2020  Mammo Screening Digital Tomosynthesis Bilateral With CAD    2020  Mammo Diagnostic Digital Tomosynthesis Right With CAD    10/21/2019  Mammo Diagnostic Digital Tomosynthesis Bilateral With CAD    Only the first 5 history entries have been loaded, but more history exists.              Last colonoscopy:  per pt - every 5 years EGD - carcinoids of stomach    Last Completed Colonoscopy          COLORECTAL CANCER SCREENING (COLONOSCOPY - Every 10 Years) Next due on 2014  COLONOSCOPY (Done - normal)              Last DEXA: 2020 spine normal hips osteopenia  Last Pap : [id number] neg vag cuff  Last Completed Pap Smear     This patient has no relevant Health Maintenance data.        History of abnormal Pap smear: no    Additional OB/GYN History   Current contraception: contraceptive methods: Post menopausal status  Desires to: do not start contraception  Family history of uterine, colon, breast, or ovarian cancer: yes - breast cancer - maternal aunt, paternal aunts x 2 and ovarian cancer in one paternal cousin   Performs monthly Self-Breast Exam: yes - she tries  Parental Hip Fracture: denies  Exercises Regularly: no  Feelings of Anxiety or Depression: yes - well on  "Effexor    Tobacco Usage?: No   OB History        2    Para   2    Term   2       0    AB   0    Living   2       SAB        IAB        Ectopic        Molar        Multiple        Live Births   2                Health Maintenance   Topic Date Due   • DXA SCAN  Never done   • COVID-19 Vaccine (1) Never done   • ZOSTER VACCINE (1 of 2) Never done   • Pneumococcal Vaccine 65+ (1 of 1 - PPSV23) Never done   • HEPATITIS C SCREENING  Never done   • ANNUAL WELLNESS VISIT  Never done   • PAP SMEAR  2021   • INFLUENZA VACCINE  2021   • MAMMOGRAM  2022   • COLORECTAL CANCER SCREENING  2024   • TDAP/TD VACCINES (2 - Td or Tdap) 2027       The additional following portions of the patient's history were reviewed and updated as appropriate: allergies, current medications, past family history, past social history, past surgical history and problem list.    Review of Systems   Genitourinary: Positive for urinary incontinence.       I have reviewed and agree with the HPI, ROS, and historical information as entered above. Frida Min MD    Objective   /88   Ht 165.1 cm (65\")   Wt 86.2 kg (190 lb)   BMI 31.62 kg/m²     Physical Exam  Vitals and nursing note reviewed. Exam conducted with a chaperone present.   Constitutional:       Appearance: She is well-developed.   HENT:      Head: Normocephalic and atraumatic.   Neck:      Thyroid: No thyroid mass or thyromegaly.   Cardiovascular:      Rate and Rhythm: Normal rate and regular rhythm.      Heart sounds: No murmur heard.      Pulmonary:      Effort: Pulmonary effort is normal. No retractions.      Breath sounds: Normal breath sounds. No wheezing, rhonchi or rales.   Chest:      Chest wall: No mass or tenderness.   Breasts:      Right: Normal. No mass, nipple discharge, skin change or tenderness.      Left: Normal. No mass, nipple discharge, skin change or tenderness.       Abdominal:      General: Bowel sounds are normal.      " Palpations: Abdomen is soft. Abdomen is not rigid. There is no mass.      Tenderness: There is no abdominal tenderness. There is no guarding.      Hernia: No hernia is present. There is no hernia in the left inguinal area or right inguinal area.   Genitourinary:     General: Normal vulva.      Exam position: Lithotomy position.      Pubic Area: No rash.       Labia:         Right: No rash, tenderness or lesion.         Left: No rash, tenderness or lesion.       Urethra: No urethral pain or urethral swelling.      Vagina: Normal. No vaginal discharge or lesions.      Uterus: Absent.       Adnexa:         Right: No mass, tenderness or fullness.          Left: No mass, tenderness or fullness.        Rectum: No external hemorrhoid.      Comments: Cervix surgically absent.  Vaginal cuff intact.  Musculoskeletal:      Cervical back: Normal range of motion. No muscular tenderness.   Neurological:      Mental Status: She is alert and oriented to person, place, and time.   Psychiatric:         Behavior: Behavior normal.           Assessment/Plan     Encounter Diagnoses   Name Primary?   • Status post hysterectomy Yes   • Women's annual routine gynecological examination    • Ductal carcinoma in situ (DCIS) of right breast    • Encounter for breast cancer screening using non-mammogram modality    • Postmenopausal status    • COLE (stress urinary incontinence, female)        Recommended use of Vitamin D replacement and getting adequate calcium in her diet. (1500mg)  BDS reviewed.  Recommended use of Vitamin D replacement and getting adequate calcium in her diet. (1500mg).  Her FRAX scores indicated need for treatment.  Discussed options and will proceed with Boniva if not too expensive.  Reviewed monthly self breast exams.  Instructed to call with lumps, pain, or breast discharge.    Continue yearly mammography  Mild COLE - enocouraged KEGELS and can consider pelvic floor PT if worsens.  Reviewed HPV guidelines.  Reviewed  exercise as a preventative health measures.     Frida Min MD   02/22/2022

## 2022-03-07 DIAGNOSIS — Z90.710 STATUS POST HYSTERECTOMY: ICD-10-CM

## 2022-03-07 DIAGNOSIS — Z01.419 WOMEN'S ANNUAL ROUTINE GYNECOLOGICAL EXAMINATION: ICD-10-CM

## 2022-03-07 DIAGNOSIS — D05.11 DUCTAL CARCINOMA IN SITU (DCIS) OF RIGHT BREAST: ICD-10-CM

## 2022-04-14 ENCOUNTER — TELEPHONE (OUTPATIENT)
Dept: OBSTETRICS AND GYNECOLOGY | Facility: CLINIC | Age: 72
End: 2022-04-14

## 2022-04-14 DIAGNOSIS — F41.9 ANXIETY: Primary | ICD-10-CM

## 2022-04-14 RX ORDER — VENLAFAXINE HYDROCHLORIDE 75 MG/1
75 CAPSULE, EXTENDED RELEASE ORAL DAILY
Qty: 90 CAPSULE | Refills: 3 | Status: SHIPPED | OUTPATIENT
Start: 2022-04-14 | End: 2023-03-01 | Stop reason: SDUPTHER

## 2022-12-08 ENCOUNTER — OFFICE VISIT (OUTPATIENT)
Dept: CARDIOLOGY | Facility: CLINIC | Age: 72
End: 2022-12-08

## 2022-12-08 VITALS
HEIGHT: 65 IN | OXYGEN SATURATION: 97 % | BODY MASS INDEX: 32.19 KG/M2 | HEART RATE: 78 BPM | DIASTOLIC BLOOD PRESSURE: 73 MMHG | SYSTOLIC BLOOD PRESSURE: 126 MMHG | WEIGHT: 193.2 LBS

## 2022-12-08 DIAGNOSIS — R07.9 CHEST PAIN, UNSPECIFIED TYPE: Primary | ICD-10-CM

## 2022-12-08 DIAGNOSIS — I10 PRIMARY HYPERTENSION: ICD-10-CM

## 2022-12-08 DIAGNOSIS — R06.02 SHORTNESS OF BREATH: ICD-10-CM

## 2022-12-08 PROCEDURE — 93000 ELECTROCARDIOGRAM COMPLETE: CPT | Performed by: PHYSICIAN ASSISTANT

## 2022-12-08 PROCEDURE — 99204 OFFICE O/P NEW MOD 45 MIN: CPT | Performed by: PHYSICIAN ASSISTANT

## 2022-12-08 NOTE — PROGRESS NOTES
Problem list     Subjective   Jazmyne Yang is a 72 y.o. female     Chief Complaint   Patient presents with   • Establish Care     New Pt.       HPI    Patient is a 72-year-old female who presents to the office to establish care.  She has no previous history of coronary disease or structural heart disease.    I am well acquainted with this patient having seen her father here in the office.  She has been experiencing occasional discomfort in her chest.  For some time she has felt occasional heaviness and this will be diffuse across the upper chest.  This is random but noticeable.  It has not been severe nor has she went to the ER for chest pain or took medication to resolve it.    Patient also experiences mild dyspnea when exerting.  For instance, she describes whenever she climbs up stairs that she will become short of breath and it is quite noticeable.  She does not describe PND or orthopnea.    She does not describe palpitating nor does she complain of dysrhythmic symptoms.  She otherwise is doing well.      Current Outpatient Medications on File Prior to Visit   Medication Sig Dispense Refill   • ibandronate (Boniva) 150 MG tablet Take 1 tablet by mouth Every 30 (Thirty) Days. 12 tablet 12   • levothyroxine (SYNTHROID)      • losartan (COZAAR) 50 MG tablet      • venlafaxine XR (Effexor XR) 75 MG 24 hr capsule Take 1 capsule by mouth Daily. 90 capsule 3   • clobetasol (TEMOVATE) 0.05 % cream apply twice daily topically TIME TWO WEEKS, AVOID face     • meloxicam (MOBIC) 7.5 MG tablet Take 7.5 mg by mouth Daily.     • metroNIDAZOLE (FLAGYL) 250 MG tablet Take 250 mg by mouth Every 8 (Eight) Hours.       Current Facility-Administered Medications on File Prior to Visit   Medication Dose Route Frequency Provider Last Rate Last Admin   • lidocaine (XYLOCAINE) 1 % injection 5 mL  5 mL Infiltration Once Kristine Reed MD           Patient has no known allergies.    Past Medical History:   Diagnosis Date   •  Anxiety    • Disease of thyroid gland        Social History     Socioeconomic History   • Marital status:    Tobacco Use   • Smoking status: Never   • Smokeless tobacco: Never   Substance and Sexual Activity   • Alcohol use: Not Currently   • Drug use: Never   • Sexual activity: Not Currently     Birth control/protection: Post-menopausal       Family History   Problem Relation Age of Onset   • Breast cancer Maternal Aunt 70   • Breast cancer Paternal Aunt         DX AGE 70'S   • Ovarian cancer Paternal Cousin 45   • Breast cancer Paternal Aunt         DX AGE LATE 60'S   • Coronary artery disease Father    • Transient ischemic attack Father    • Diabetes Father    • Leukemia Mother    • Stomach cancer Mother    • Endometrial cancer Neg Hx    • BRCA 1/2 Neg Hx        Review of Systems   Constitutional: Positive for fatigue.   HENT: Negative for sinus pressure, sinus pain, sore throat, trouble swallowing and voice change.    Eyes: Negative for pain, redness, itching and visual disturbance.   Respiratory: Positive for choking (pt states to choke easily. ) and shortness of breath. Negative for cough, chest tightness and wheezing.    Cardiovascular: Positive for chest pain. Negative for palpitations and leg swelling.   Gastrointestinal: Negative.  Negative for anal bleeding, blood in stool, constipation, diarrhea and nausea.   Genitourinary: Negative.  Negative for difficulty urinating.   Musculoskeletal: Positive for arthralgias, back pain, neck pain and neck stiffness.   Skin: Negative for rash and wound.   Neurological: Positive for dizziness (seldom whenever getting up.), weakness (weakness all over body, ), numbness (hand can be numb at times. ) and headaches. Negative for light-headedness.   Hematological: Does not bruise/bleed easily.   Psychiatric/Behavioral: Negative for sleep disturbance.       Objective   Vitals:    12/08/22 1357   BP: 126/73   Pulse: 78   SpO2: 97%   Weight: 87.6 kg (193 lb 3.2 oz)  "  Height: 165.1 cm (65\")      /73   Pulse 78   Ht 165.1 cm (65\")   Wt 87.6 kg (193 lb 3.2 oz)   SpO2 97%   BMI 32.15 kg/m²     Lab Results (most recent)     None          Physical Exam  Vitals and nursing note reviewed.   Constitutional:       General: She is not in acute distress.     Appearance: Normal appearance. She is well-developed.   HENT:      Head: Normocephalic and atraumatic.   Eyes:      General: No scleral icterus.        Right eye: No discharge.         Left eye: No discharge.      Conjunctiva/sclera: Conjunctivae normal.   Neck:      Vascular: No carotid bruit.   Cardiovascular:      Rate and Rhythm: Normal rate and regular rhythm.      Heart sounds: Normal heart sounds. No murmur heard.    No friction rub. No gallop.   Pulmonary:      Effort: Pulmonary effort is normal. No respiratory distress.      Breath sounds: Normal breath sounds. No wheezing or rales.   Chest:      Chest wall: No tenderness.   Musculoskeletal:      Right lower leg: No edema.      Left lower leg: No edema.   Skin:     General: Skin is warm and dry.      Coloration: Skin is not pale.      Findings: No erythema or rash.   Neurological:      Mental Status: She is alert and oriented to person, place, and time.      Cranial Nerves: No cranial nerve deficit.   Psychiatric:         Behavior: Behavior normal.         Procedure     ECG 12 Lead    Date/Time: 12/8/2022 2:27 PM  Performed by: Farshad Cosme PA  Authorized by: Farshad Cosme PA   Comparison: not compared with previous ECG   Comments: EKG demonstrates sinus rhythm at 71 bpm with first-degree AV block and no acute ST changes               Assessment & Plan     Problems Addressed this Visit        Cardiac and Vasculature    Primary hypertension    Relevant Orders    Adult Transthoracic Echo Complete W/ Cont if Necessary Per Protocol    Stress Test With Myocardial Perfusion One Day    Chest pain - Primary    Relevant Orders    Adult Transthoracic Echo Complete " W/ Cont if Necessary Per Protocol    Stress Test With Myocardial Perfusion One Day       Pulmonary and Pneumonias    Shortness of breath    Relevant Orders    Adult Transthoracic Echo Complete W/ Cont if Necessary Per Protocol    Stress Test With Myocardial Perfusion One Day   Diagnoses       Codes Comments    Chest pain, unspecified type    -  Primary ICD-10-CM: R07.9  ICD-9-CM: 786.50     Shortness of breath     ICD-10-CM: R06.02  ICD-9-CM: 786.05     Primary hypertension     ICD-10-CM: I10  ICD-9-CM: 401.9         Recommendation  1.  Patient is a 72-year-old female who presents to the office to be evaluated with complaints of chest pain and shortness of breath.  Because of her symptoms, would like to schedule testing to evaluate further    2.  Stress test we will refer to ischemia assessment.  Echo to assess and evaluate LV systolic and diastolic function, valvular structures etc.    3.  We discussed nitroglycerin but she does not feel she needs it at this point.  I did discuss that if symptoms worsen such as discomfort, she needs to go to the ER.  We will order testing and see her back for follow-up and recommend further.    4.  Otherwise blood pressure currently controlled on current medical regimen.  I will make no changes.  We will see her back for follow-up after testing.  Follow-up with primary as scheduled.           Jazmyne KI Yang  reports that she has never smoked. She has never used smokeless tobacco..\        Advance Care Planning   ACP discussion was declined by the patient. Patient has an advance directive (not in EMR), copy requested.             Electronically signed by:

## 2022-12-28 ENCOUNTER — HOSPITAL ENCOUNTER (OUTPATIENT)
Dept: MAMMOGRAPHY | Facility: HOSPITAL | Age: 72
Discharge: HOME | End: 2022-12-28
Admitting: OBSTETRICS & GYNECOLOGY

## 2022-12-28 DIAGNOSIS — D05.11 DUCTAL CARCINOMA IN SITU (DCIS) OF RIGHT BREAST: ICD-10-CM

## 2022-12-28 DIAGNOSIS — Z12.39 ENCOUNTER FOR BREAST CANCER SCREENING USING NON-MAMMOGRAM MODALITY: ICD-10-CM

## 2022-12-28 DIAGNOSIS — Z12.31 ENCOUNTER FOR SCREENING MAMMOGRAM FOR MALIGNANT NEOPLASM OF BREAST: ICD-10-CM

## 2022-12-28 PROCEDURE — 77067 SCR MAMMO BI INCL CAD: CPT

## 2022-12-28 PROCEDURE — 77067 SCR MAMMO BI INCL CAD: CPT | Performed by: RADIOLOGY

## 2022-12-28 PROCEDURE — 77063 BREAST TOMOSYNTHESIS BI: CPT | Performed by: RADIOLOGY

## 2022-12-28 PROCEDURE — 77063 BREAST TOMOSYNTHESIS BI: CPT

## 2023-01-10 ENCOUNTER — HOSPITAL ENCOUNTER (OUTPATIENT)
Dept: CARDIOLOGY | Facility: HOSPITAL | Age: 73
Discharge: HOME | End: 2023-01-10
Payer: MEDICARE

## 2023-01-10 VITALS — HEIGHT: 65 IN | BODY MASS INDEX: 32.18 KG/M2 | WEIGHT: 193.12 LBS

## 2023-01-10 DIAGNOSIS — R07.9 CHEST PAIN, UNSPECIFIED TYPE: ICD-10-CM

## 2023-01-10 DIAGNOSIS — I10 PRIMARY HYPERTENSION: ICD-10-CM

## 2023-01-10 DIAGNOSIS — R06.02 SHORTNESS OF BREATH: ICD-10-CM

## 2023-01-10 PROCEDURE — 0 TECHNETIUM SESTAMIBI: Performed by: INTERNAL MEDICINE

## 2023-01-10 PROCEDURE — 93306 TTE W/DOPPLER COMPLETE: CPT

## 2023-01-10 PROCEDURE — A9500 TC99M SESTAMIBI: HCPCS | Performed by: INTERNAL MEDICINE

## 2023-01-10 PROCEDURE — 93306 TTE W/DOPPLER COMPLETE: CPT | Performed by: INTERNAL MEDICINE

## 2023-01-10 PROCEDURE — 78452 HT MUSCLE IMAGE SPECT MULT: CPT

## 2023-01-10 PROCEDURE — 78452 HT MUSCLE IMAGE SPECT MULT: CPT | Performed by: INTERNAL MEDICINE

## 2023-01-10 PROCEDURE — 93017 CV STRESS TEST TRACING ONLY: CPT

## 2023-01-10 PROCEDURE — 93018 CV STRESS TEST I&R ONLY: CPT | Performed by: INTERNAL MEDICINE

## 2023-01-10 RX ADMIN — TECHNETIUM TC 99M SESTAMIBI 1 DOSE: 1 INJECTION INTRAVENOUS at 11:48

## 2023-01-10 RX ADMIN — TECHNETIUM TC 99M SESTAMIBI 1 DOSE: 1 INJECTION INTRAVENOUS at 13:14

## 2023-01-11 LAB
BH CV REST NUCLEAR ISOTOPE DOSE: 10 MCI
BH CV STRESS NUCLEAR ISOTOPE DOSE: 30 MCI
BH CV STRESS RECOVERY BP: NORMAL MMHG
BH CV STRESS RECOVERY HR: 84 BPM
MAXIMAL PREDICTED HEART RATE: 149 BPM
PERCENT MAX PREDICTED HR: 93.29 %
STRESS BASELINE BP: NORMAL MMHG
STRESS BASELINE HR: 72 BPM
STRESS PERCENT HR: 110 %
STRESS POST ESTIMATED WORKLOAD: 4.6 METS
STRESS POST EXERCISE DUR MIN: 3 MIN
STRESS POST EXERCISE DUR SEC: 59 SEC
STRESS POST PEAK BP: NORMAL MMHG
STRESS POST PEAK HR: 139 BPM
STRESS TARGET HR: 127 BPM

## 2023-01-12 ENCOUNTER — TELEPHONE (OUTPATIENT)
Dept: CARDIOLOGY | Facility: CLINIC | Age: 73
End: 2023-01-12
Payer: MEDICARE

## 2023-01-12 NOTE — TELEPHONE ENCOUNTER
STRESS  Pt notified of no acute findings. Provider will discuss results at f/u. Pt reminded of appt date and time.  ----- Message from COBY Ramirez sent at 1/12/2023  9:50 AM EST -----  Routine follow-up

## 2023-01-21 LAB
AORTIC DIMENSIONLESS INDEX: 0.76 (DI)
BH CV ECHO MEAS - ACS: 1.82 CM
BH CV ECHO MEAS - AO MAX PG: 7.4 MMHG
BH CV ECHO MEAS - AO MEAN PG: 4.1 MMHG
BH CV ECHO MEAS - AO ROOT DIAM: 3.2 CM
BH CV ECHO MEAS - AO V2 MAX: 136.2 CM/SEC
BH CV ECHO MEAS - AO V2 VTI: 33.7 CM
BH CV ECHO MEAS - EDV(CUBED): 101.9 ML
BH CV ECHO MEAS - EF_3D-VOL: 67 %
BH CV ECHO MEAS - ESV(CUBED): 38.9 ML
BH CV ECHO MEAS - FS: 27.5 %
BH CV ECHO MEAS - IVS/LVPW: 1.12 CM
BH CV ECHO MEAS - IVSD: 1.03 CM
BH CV ECHO MEAS - LA DIMENSION: 3.3 CM
BH CV ECHO MEAS - LAT PEAK E' VEL: 7.8 CM/SEC
BH CV ECHO MEAS - LV MASS(C)D: 158.2 GRAMS
BH CV ECHO MEAS - LV MAX PG: 4.3 MMHG
BH CV ECHO MEAS - LV MEAN PG: 1.84 MMHG
BH CV ECHO MEAS - LV V1 MAX: 103.7 CM/SEC
BH CV ECHO MEAS - LV V1 VTI: 22.8 CM
BH CV ECHO MEAS - LVIDD: 4.7 CM
BH CV ECHO MEAS - LVIDS: 3.4 CM
BH CV ECHO MEAS - LVPWD: 0.92 CM
BH CV ECHO MEAS - MED PEAK E' VEL: 7.1 CM/SEC
BH CV ECHO MEAS - MV A MAX VEL: 111.9 CM/SEC
BH CV ECHO MEAS - MV DEC SLOPE: 439.9 CM/SEC2
BH CV ECHO MEAS - MV DEC TIME: 0.27 MSEC
BH CV ECHO MEAS - MV E MAX VEL: 96.4 CM/SEC
BH CV ECHO MEAS - MV E/A: 0.86
BH CV ECHO MEAS - MV MAX PG: 6.2 MMHG
BH CV ECHO MEAS - MV MEAN PG: 2.6 MMHG
BH CV ECHO MEAS - MV P1/2T: 77.2 MSEC
BH CV ECHO MEAS - MV V2 VTI: 39.9 CM
BH CV ECHO MEAS - MVA(P1/2T): 2.9 CM2
BH CV ECHO MEAS - PA V2 MAX: 95.6 CM/SEC
BH CV ECHO MEAS - RAP SYSTOLE: 10 MMHG
BH CV ECHO MEAS - RV MAX PG: 1.62 MMHG
BH CV ECHO MEAS - RV V1 MAX: 63.7 CM/SEC
BH CV ECHO MEAS - RV V1 VTI: 13.7 CM
BH CV ECHO MEAS - RVDD: 3 CM
BH CV ECHO MEAS - RVSP: 31.1 MMHG
BH CV ECHO MEAS - TAPSE (>1.6): 2.6 CM
BH CV ECHO MEAS - TR MAX PG: 21.1 MMHG
BH CV ECHO MEAS - TR MAX VEL: 229.9 CM/SEC
BH CV ECHO MEASUREMENTS AVERAGE E/E' RATIO: 12.94
BH CV XLRA - TDI S': 12.1 CM/SEC
MAXIMAL PREDICTED HEART RATE: 149 BPM
SINUS: 2.9 CM
STRESS TARGET HR: 127 BPM

## 2023-01-23 ENCOUNTER — TELEPHONE (OUTPATIENT)
Dept: CARDIOLOGY | Facility: CLINIC | Age: 73
End: 2023-01-23
Payer: MEDICARE

## 2023-01-23 NOTE — TELEPHONE ENCOUNTER
ECHO  Called patient to notify of no acute findings or abnormalities. Keep follow up as scheduled. If you have any problem between now and then give our office a call.   ----- Message from COBY Ramirez sent at 1/23/2023 10:17 AM EST -----  Routine follow-up

## 2023-03-01 ENCOUNTER — OFFICE VISIT (OUTPATIENT)
Dept: OBSTETRICS AND GYNECOLOGY | Facility: CLINIC | Age: 73
End: 2023-03-01
Payer: MEDICARE

## 2023-03-01 VITALS
DIASTOLIC BLOOD PRESSURE: 90 MMHG | HEIGHT: 65 IN | BODY MASS INDEX: 32.36 KG/M2 | SYSTOLIC BLOOD PRESSURE: 134 MMHG | WEIGHT: 194.2 LBS

## 2023-03-01 DIAGNOSIS — Z12.31 ENCOUNTER FOR SCREENING MAMMOGRAM FOR MALIGNANT NEOPLASM OF BREAST: ICD-10-CM

## 2023-03-01 DIAGNOSIS — Z78.0 POSTMENOPAUSAL: Primary | ICD-10-CM

## 2023-03-01 DIAGNOSIS — Z78.0 OSTEOPENIA AFTER MENOPAUSE: ICD-10-CM

## 2023-03-01 DIAGNOSIS — M85.80 OSTEOPENIA AFTER MENOPAUSE: ICD-10-CM

## 2023-03-01 DIAGNOSIS — Z12.39 ENCOUNTER FOR BREAST CANCER SCREENING USING NON-MAMMOGRAM MODALITY: ICD-10-CM

## 2023-03-01 DIAGNOSIS — F41.9 ANXIETY: ICD-10-CM

## 2023-03-01 PROCEDURE — 99214 OFFICE O/P EST MOD 30 MIN: CPT | Performed by: OBSTETRICS & GYNECOLOGY

## 2023-03-01 RX ORDER — IBANDRONATE SODIUM 150 MG/1
150 TABLET, FILM COATED ORAL
Qty: 3 TABLET | Refills: 3 | Status: SHIPPED | OUTPATIENT
Start: 2023-03-01

## 2023-03-01 RX ORDER — VENLAFAXINE HYDROCHLORIDE 75 MG/1
75 CAPSULE, EXTENDED RELEASE ORAL DAILY
Qty: 90 CAPSULE | Refills: 3 | Status: SHIPPED | OUTPATIENT
Start: 2023-03-01 | End: 2024-02-29

## 2023-03-01 NOTE — PROGRESS NOTES
Gynecologic Annual Exam Note        Postmenopausal exam       HPI  Jazmyne Yang is a 72 y.o. female, , who presents for postmenopausal exam as a established patient.  She is postmenopausal. Patient has had hysterectomy with BSO for fibroids at age 48.  Patient reports problems with: None. There were no changes to her medical or surgical history since her last visit.. Partner Status: Marital Status: .  She is is sexually active. She has not had new partners.. STD testing recommendations have been explained to the patient and she does not desire STD testing.    Additional OB/GYN History   On HRT? No    Last Pap : 2022. Results: negative. HPV: negative.   Last Completed Pap Smear          Ordered - PAP SMEAR (Every 2 Years) Ordered on 3/7/2022    2022  SCANNED - PAP SMEAR    2019  Done - Negative              History of abnormal Pap smear: no  Family history of uterine, colon, breast, or ovarian cancer: yes - Paternal first cousin ovarian cancer, PA and MA had breast cancer  Performs monthly Self-Breast Exam: on occasion  Last mammogram: 2022. Done at . Negative    Last Completed Mammogram          MAMMOGRAM (Yearly) Next due on 2023  Mammo Screening Digital Tomosynthesis Bilateral With CAD    2021  Mammo Screening Digital Tomosynthesis Bilateral With CAD    2021  MRI Breast Bilateral With & Without Contrast    10/20/2020  Mammo Screening Digital Tomosynthesis Bilateral With CAD    2020  Mammo Diagnostic Digital Tomosynthesis Right With CAD    Only the first 5 history entries have been loaded, but more history exists.              Last colonoscopy: has had a colonoscopy 3 year(s) ago.    Last Completed Colonoscopy          COLORECTAL CANCER SCREENING (COLONOSCOPY - Every 10 Years) Next due on 2014  COLONOSCOPY (Done - normal)                  Last bone density scan (DEXA): In  and FRAX scores indicated need  for treatment.  Exercises Regularly: no  Feelings of Anxiety or Depression: yes - anxiety and depression currently treated with medication      Tobacco Usage?: No       Current Outpatient Medications:   •  Cyanocobalamin 1000 MCG/ML liquid, , Disp: , Rfl:   •  ibandronate (Boniva) 150 MG tablet, Take 1 tablet by mouth Every 30 (Thirty) Days., Disp: 3 tablet, Rfl: 3  •  levothyroxine (SYNTHROID), , Disp: , Rfl:   •  losartan (COZAAR) 50 MG tablet, , Disp: , Rfl:   •  venlafaxine XR (Effexor XR) 75 MG 24 hr capsule, Take 1 capsule by mouth Daily., Disp: 90 capsule, Rfl: 3    Current Facility-Administered Medications:   •  lidocaine (XYLOCAINE) 1 % injection 5 mL, 5 mL, Infiltration, Once, Kristine Reed MD    Patient is requesting refills of Boniva nad Venlafaxine.    OB History        2    Para   2    Term   2       0    AB   0    Living   2       SAB        IAB        Ectopic        Molar        Multiple        Live Births   2                Past Medical History:   Diagnosis Date   • Anxiety    • Disease of thyroid gland         Past Surgical History:   Procedure Laterality Date   • BREAST BIOPSY Right 2019   • BREAST EXCISIONAL BIOPSY Right    • BREAST EXCISIONAL BIOPSY Right 2020   • HYSTERECTOMY      AGE 48   • OOPHORECTOMY Bilateral     AGE 48       Health Maintenance   Topic Date Due   • COVID-19 Vaccine (1) Never done   • ZOSTER VACCINE (1 of 2) Never done   • Pneumococcal Vaccine 65+ (1 - PCV) Never done   • HEPATITIS C SCREENING  Never done   • ANNUAL WELLNESS VISIT  Never done   • INFLUENZA VACCINE  2022   • MAMMOGRAM  2023   • COLORECTAL CANCER SCREENING  2024   • PAP SMEAR  2024   • DXA SCAN  2024   • TDAP/TD VACCINES (2 - Td or Tdap) 2027       The additional following portions of the patient's history were reviewed and updated as appropriate: allergies, current medications, past family history, past medical history, past social history,  "past surgical history and problem list.    Review of Systems    I have reviewed and agree with the HPI, ROS, and historical information as entered above. Frida Min MD    Objective   /90   Ht 165 cm (64.96\")   Wt 88.1 kg (194 lb 3.2 oz)   BMI 32.36 kg/m²     Physical Exam  Vitals and nursing note reviewed. Exam conducted with a chaperone present.   Constitutional:       Appearance: She is well-developed.   HENT:      Head: Normocephalic and atraumatic.   Neck:      Thyroid: No thyroid mass or thyromegaly.   Cardiovascular:      Rate and Rhythm: Normal rate and regular rhythm.      Heart sounds: No murmur heard.  Pulmonary:      Effort: Pulmonary effort is normal. No retractions.      Breath sounds: Normal breath sounds. No wheezing, rhonchi or rales.   Chest:      Chest wall: No mass or tenderness.   Breasts:     Right: Normal. No mass, nipple discharge, skin change or tenderness.      Left: Normal. No mass, nipple discharge, skin change or tenderness.   Abdominal:      General: Bowel sounds are normal.      Palpations: Abdomen is soft. Abdomen is not rigid. There is no mass.      Tenderness: There is no abdominal tenderness. There is no guarding.      Hernia: No hernia is present. There is no hernia in the left inguinal area or right inguinal area.   Genitourinary:     General: Normal vulva.      Exam position: Lithotomy position.      Pubic Area: No rash.       Labia:         Right: No rash, tenderness or lesion.         Left: No rash, tenderness or lesion.       Urethra: No urethral pain or urethral swelling.      Vagina: Normal. No vaginal discharge or lesions.      Uterus: Absent.       Adnexa:         Right: No mass, tenderness or fullness.          Left: No mass, tenderness or fullness.        Rectum: No external hemorrhoid.      Comments: Cervix surgically absent.  Vaginal cuff intact.  Musculoskeletal:      Cervical back: Normal range of motion. No muscular tenderness.   Neurological:      " Mental Status: She is alert and oriented to person, place, and time.   Psychiatric:         Behavior: Behavior normal.            Assessment and Plan    Problem List Items Addressed This Visit    None  Visit Diagnoses     Postmenopausal    -  Primary    Encounter for breast cancer screening using non-mammogram modality        Osteopenia after menopause        Relevant Orders    DEXA Bone Density Axial    Anxiety        Relevant Medications    venlafaxine XR (Effexor XR) 75 MG 24 hr capsule          1. GYN annual well woman exam.   2. Recommended use of Vitamin D replacement and getting adequate calcium in her diet. (1500mg)  Continue Boniva and plan BDS next visit.  3. Patient reports that she is not currently experiencing any symptoms of urinary incontinence.  4. Reviewed monthly self breast exams.  Instructed to call with lumps, pain, or breast discharge.    5. Continue yearly mammography  6. KAYLEIGH - doing well on Effexor.  7. Reviewed exercise as a preventative health measures.       Frida Min MD  03/01/2023

## 2023-04-26 ENCOUNTER — TELEPHONE (OUTPATIENT)
Dept: OBSTETRICS AND GYNECOLOGY | Facility: CLINIC | Age: 73
End: 2023-04-26
Payer: MEDICARE

## 2023-04-26 DIAGNOSIS — M85.80 OSTEOPENIA AFTER MENOPAUSE: Primary | ICD-10-CM

## 2023-04-26 DIAGNOSIS — Z78.0 OSTEOPENIA AFTER MENOPAUSE: Primary | ICD-10-CM

## 2023-04-26 DIAGNOSIS — F41.9 ANXIETY: ICD-10-CM

## 2023-04-26 RX ORDER — VENLAFAXINE HYDROCHLORIDE 75 MG/1
75 CAPSULE, EXTENDED RELEASE ORAL DAILY
Qty: 90 CAPSULE | Refills: 3 | Status: SHIPPED | OUTPATIENT
Start: 2023-04-26 | End: 2024-04-25

## 2023-04-26 RX ORDER — IBANDRONATE SODIUM 150 MG/1
150 TABLET, FILM COATED ORAL
Qty: 3 TABLET | Refills: 3 | Status: SHIPPED | OUTPATIENT
Start: 2023-04-26

## 2023-04-26 NOTE — TELEPHONE ENCOUNTER
I was on hold with  for 14 minutes, pt aware. She is not sure she received the Effexor either, resending. Per pt request.

## 2023-04-26 NOTE — TELEPHONE ENCOUNTER
Patient is calling to get her medication refill. Patient states she never received it thru mail order.     RX: Boniva 150mg   Pharmacy is the [facility] and it has been verified with patient.

## 2023-11-28 ENCOUNTER — TRANSCRIBE ORDERS (OUTPATIENT)
Dept: OBSTETRICS AND GYNECOLOGY | Facility: CLINIC | Age: 73
End: 2023-11-28
Payer: MEDICARE

## 2023-11-28 DIAGNOSIS — Z12.31 VISIT FOR SCREENING MAMMOGRAM: Primary | ICD-10-CM

## 2024-01-17 ENCOUNTER — HOSPITAL ENCOUNTER (OUTPATIENT)
Dept: MAMMOGRAPHY | Facility: HOSPITAL | Age: 74
Discharge: HOME | End: 2024-01-17
Admitting: OBSTETRICS & GYNECOLOGY
Payer: MEDICARE

## 2024-01-17 DIAGNOSIS — Z12.31 VISIT FOR SCREENING MAMMOGRAM: ICD-10-CM

## 2024-01-17 PROCEDURE — 77067 SCR MAMMO BI INCL CAD: CPT

## 2024-01-17 PROCEDURE — 77063 BREAST TOMOSYNTHESIS BI: CPT

## 2024-02-06 ENCOUNTER — OFFICE VISIT (OUTPATIENT)
Dept: CARDIOLOGY | Facility: CLINIC | Age: 74
End: 2024-02-06
Payer: MEDICARE

## 2024-02-06 VITALS
DIASTOLIC BLOOD PRESSURE: 78 MMHG | WEIGHT: 194 LBS | HEART RATE: 61 BPM | OXYGEN SATURATION: 100 % | BODY MASS INDEX: 33.12 KG/M2 | SYSTOLIC BLOOD PRESSURE: 142 MMHG | HEIGHT: 64 IN

## 2024-02-06 DIAGNOSIS — I49.3 PVC'S (PREMATURE VENTRICULAR CONTRACTIONS): ICD-10-CM

## 2024-02-06 DIAGNOSIS — R07.9 CHEST PAIN, UNSPECIFIED TYPE: ICD-10-CM

## 2024-02-06 DIAGNOSIS — I10 PRIMARY HYPERTENSION: Primary | ICD-10-CM

## 2024-02-06 PROCEDURE — 3077F SYST BP >= 140 MM HG: CPT | Performed by: PHYSICIAN ASSISTANT

## 2024-02-06 PROCEDURE — 99213 OFFICE O/P EST LOW 20 MIN: CPT | Performed by: PHYSICIAN ASSISTANT

## 2024-02-06 PROCEDURE — 3078F DIAST BP <80 MM HG: CPT | Performed by: PHYSICIAN ASSISTANT

## 2024-02-06 NOTE — LETTER
February 6, 2024       No Recipients    Patient: Jazmyne Yang   YOB: 1950   Date of Visit: 2/6/2024       Dear Aissatou Feliz, DO    Jazmyne Yang was in my office today. Below is a copy of my note.    If you have questions, please do not hesitate to call me. I look forward to following Jazmyne along with you.         Sincerely,        COBY Giang        CC:   No Recipients    Problem list     Subjective  Jazmyne Yang is a 73 y.o. female     Chief Complaint   Patient presents with   • Follow-up     7 months       HPI    Patient is a 73-year-old female who presents to the office for evaluation.  She underwent testing last year to include stress test and echocardiogram.  Stress test did not demonstrate any evidence of ischemia and she had preserved systolic function.  There was some ventricular ectopy noted during procedure.  Echo was largely benign with good LV function and no significant valvular disease otherwise.    She was well.  She has no chest pain or pressure.  No complaints of dyspnea.  No PND or orthopnea.    She does not describe palpitating.  She does not describe any dysrhythmic symptoms.  She is stable otherwise.      Current Outpatient Medications on File Prior to Visit   Medication Sig Dispense Refill   • Cyanocobalamin 1000 MCG/ML liquid      • ibandronate (Boniva) 150 MG tablet Take 1 tablet by mouth Every 30 (Thirty) Days. 3 tablet 3   • levothyroxine (SYNTHROID)      • losartan (COZAAR) 50 MG tablet 2 tablets.     • venlafaxine XR (Effexor XR) 75 MG 24 hr capsule Take 1 capsule by mouth Daily. 90 capsule 3     Current Facility-Administered Medications on File Prior to Visit   Medication Dose Route Frequency Provider Last Rate Last Admin   • lidocaine (XYLOCAINE) 1 % injection 5 mL  5 mL Infiltration Once Kristine Reed MD           Patient has no known allergies.    Past Medical History:   Diagnosis Date   • Anxiety    • Disease of thyroid gland   "      Social History     Socioeconomic History   • Marital status:    Tobacco Use   • Smoking status: Never   • Smokeless tobacco: Never   Substance and Sexual Activity   • Alcohol use: Not Currently   • Drug use: Never   • Sexual activity: Not Currently     Birth control/protection: Post-menopausal       Family History   Problem Relation Age of Onset   • Leukemia Mother    • Stomach cancer Mother    • Coronary artery disease Father    • Transient ischemic attack Father    • Diabetes Father    • Breast cancer Maternal Aunt 70   • Breast cancer Paternal Aunt         DX AGE 70'S   • Breast cancer Paternal Aunt         DX AGE LATE 60'S   • Ovarian cancer Paternal Cousin 45   • Endometrial cancer Neg Hx    • BRCA 1/2 Neg Hx        Review of Systems   Constitutional:  Negative for activity change, appetite change, chills, fatigue and fever.   HENT: Negative.  Negative for congestion and sinus pressure.    Eyes: Negative.  Negative for visual disturbance.   Respiratory: Negative.  Negative for apnea, cough, chest tightness, shortness of breath and wheezing.    Cardiovascular: Negative.  Positive for palpitations. Negative for chest pain and leg swelling.   Endocrine: Negative.  Negative for cold intolerance and heat intolerance.   Genitourinary: Negative.  Negative for hematuria.   Musculoskeletal: Negative.  Negative for gait problem.   Skin: Negative.  Negative for color change, rash and wound.   Allergic/Immunologic: Negative.  Negative for environmental allergies and food allergies.   Neurological:  Negative for dizziness, syncope, weakness, light-headedness, numbness and headaches.   Hematological: Negative.  Does not bruise/bleed easily.   Psychiatric/Behavioral: Negative.  Negative for sleep disturbance.        Objective  Vitals:    02/06/24 1354   BP: 142/78   BP Location: Left arm   Patient Position: Sitting   Cuff Size: Adult   Pulse: 61   SpO2: 100%   Weight: 88 kg (194 lb)   Height: 162.6 cm (64\")    " "  /78 (BP Location: Left arm, Patient Position: Sitting, Cuff Size: Adult)   Pulse 61   Ht 162.6 cm (64\")   Wt 88 kg (194 lb)   SpO2 100%   BMI 33.30 kg/m²     Lab Results (most recent)       None            Physical Exam  Vitals and nursing note reviewed.   Constitutional:       General: She is not in acute distress.     Appearance: Normal appearance. She is well-developed.   HENT:      Head: Normocephalic and atraumatic.   Eyes:      General: No scleral icterus.        Right eye: No discharge.         Left eye: No discharge.      Conjunctiva/sclera: Conjunctivae normal.   Neck:      Vascular: No carotid bruit.   Cardiovascular:      Rate and Rhythm: Normal rate and regular rhythm.      Heart sounds: Normal heart sounds. No murmur heard.     No friction rub. No gallop.   Pulmonary:      Effort: Pulmonary effort is normal. No respiratory distress.      Breath sounds: Normal breath sounds. No wheezing or rales.   Chest:      Chest wall: No tenderness.   Musculoskeletal:      Right lower leg: No edema.      Left lower leg: No edema.   Skin:     General: Skin is warm and dry.      Coloration: Skin is not pale.      Findings: No erythema or rash.   Neurological:      Mental Status: She is alert and oriented to person, place, and time.      Cranial Nerves: No cranial nerve deficit.   Psychiatric:         Behavior: Behavior normal.         Procedure  Procedures       Assessment & Plan    Problems Addressed this Visit          Cardiac and Vasculature    Primary hypertension - Primary    Chest pain    PVC's (premature ventricular contractions)     Diagnoses         Codes Comments    Primary hypertension    -  Primary ICD-10-CM: I10  ICD-9-CM: 401.9     PVC's (premature ventricular contractions)     ICD-10-CM: I49.3  ICD-9-CM: 427.69     Chest pain, unspecified type     ICD-10-CM: R07.9  ICD-9-CM: 786.50           Recommendation  1.  Patient is a 73-year-old female that underwent testing which was benign.  She " initially had some chest pain and dyspnea but that seems to have improved.  She recently lost her father and speaks about that today.  However, her chest pain has resolved completely and with lower stress test and normal systolic function, feel it is reasonable to continue to monitor without any further intervention unless symptoms change.    2.  Patient with premature beats during the stress test but not symptomatic.  In the setting, with normal systolic function and no evidence of ischemia, nothing further is indicated.    3.  Patient with baseline hypertension.  She is on losartan.  We can monitor for now.  If this continues to be elevated, we can consider adjusting her medication.  Otherwise, we will see her back for follow-up in a year or sooner if needed.  Follow-up with primary as scheduled.         Patient brought in medicine list to appointment, it's been reviewed with patient and med list was updated in the chart.      Advance Care Planning  ACP discussion was declined by the patient. Patient has an advance directive in EMR which is still valid.       Electronically signed by:

## 2024-02-06 NOTE — PROGRESS NOTES
Problem list     Subjective   Jazmyne Yang is a 73 y.o. female     Chief Complaint   Patient presents with    Follow-up     7 months       HPI    Patient is a 73-year-old female who presents to the office for evaluation.  She underwent testing last year to include stress test and echocardiogram.  Stress test did not demonstrate any evidence of ischemia and she had preserved systolic function.  There was some ventricular ectopy noted during procedure.  Echo was largely benign with good LV function and no significant valvular disease otherwise.    She was well.  She has no chest pain or pressure.  No complaints of dyspnea.  No PND or orthopnea.    She does not describe palpitating.  She does not describe any dysrhythmic symptoms.  She is stable otherwise.      Current Outpatient Medications on File Prior to Visit   Medication Sig Dispense Refill    Cyanocobalamin 1000 MCG/ML liquid       ibandronate (Boniva) 150 MG tablet Take 1 tablet by mouth Every 30 (Thirty) Days. 3 tablet 3    levothyroxine (SYNTHROID)       losartan (COZAAR) 50 MG tablet 2 tablets.      venlafaxine XR (Effexor XR) 75 MG 24 hr capsule Take 1 capsule by mouth Daily. 90 capsule 3     Current Facility-Administered Medications on File Prior to Visit   Medication Dose Route Frequency Provider Last Rate Last Admin    lidocaine (XYLOCAINE) 1 % injection 5 mL  5 mL Infiltration Once Kristine Reed MD           Patient has no known allergies.    Past Medical History:   Diagnosis Date    Anxiety     Disease of thyroid gland        Social History     Socioeconomic History    Marital status:    Tobacco Use    Smoking status: Never    Smokeless tobacco: Never   Substance and Sexual Activity    Alcohol use: Not Currently    Drug use: Never    Sexual activity: Not Currently     Birth control/protection: Post-menopausal       Family History   Problem Relation Age of Onset    Leukemia Mother     Stomach cancer Mother     Coronary artery disease  "Father     Transient ischemic attack Father     Diabetes Father     Breast cancer Maternal Aunt 70    Breast cancer Paternal Aunt         DX AGE 70'S    Breast cancer Paternal Aunt         DX AGE LATE 60'S    Ovarian cancer Paternal Cousin 45    Endometrial cancer Neg Hx     BRCA 1/2 Neg Hx        Review of Systems   Constitutional:  Negative for activity change, appetite change, chills, fatigue and fever.   HENT: Negative.  Negative for congestion and sinus pressure.    Eyes: Negative.  Negative for visual disturbance.   Respiratory: Negative.  Negative for apnea, cough, chest tightness, shortness of breath and wheezing.    Cardiovascular: Negative.  Positive for palpitations. Negative for chest pain and leg swelling.   Endocrine: Negative.  Negative for cold intolerance and heat intolerance.   Genitourinary: Negative.  Negative for hematuria.   Musculoskeletal: Negative.  Negative for gait problem.   Skin: Negative.  Negative for color change, rash and wound.   Allergic/Immunologic: Negative.  Negative for environmental allergies and food allergies.   Neurological:  Negative for dizziness, syncope, weakness, light-headedness, numbness and headaches.   Hematological: Negative.  Does not bruise/bleed easily.   Psychiatric/Behavioral: Negative.  Negative for sleep disturbance.        Objective   Vitals:    02/06/24 1354   BP: 142/78   BP Location: Left arm   Patient Position: Sitting   Cuff Size: Adult   Pulse: 61   SpO2: 100%   Weight: 88 kg (194 lb)   Height: 162.6 cm (64\")      /78 (BP Location: Left arm, Patient Position: Sitting, Cuff Size: Adult)   Pulse 61   Ht 162.6 cm (64\")   Wt 88 kg (194 lb)   SpO2 100%   BMI 33.30 kg/m²     Lab Results (most recent)       None            Physical Exam  Vitals and nursing note reviewed.   Constitutional:       General: She is not in acute distress.     Appearance: Normal appearance. She is well-developed.   HENT:      Head: Normocephalic and atraumatic.   Eyes: "      General: No scleral icterus.        Right eye: No discharge.         Left eye: No discharge.      Conjunctiva/sclera: Conjunctivae normal.   Neck:      Vascular: No carotid bruit.   Cardiovascular:      Rate and Rhythm: Normal rate and regular rhythm.      Heart sounds: Normal heart sounds. No murmur heard.     No friction rub. No gallop.   Pulmonary:      Effort: Pulmonary effort is normal. No respiratory distress.      Breath sounds: Normal breath sounds. No wheezing or rales.   Chest:      Chest wall: No tenderness.   Musculoskeletal:      Right lower leg: No edema.      Left lower leg: No edema.   Skin:     General: Skin is warm and dry.      Coloration: Skin is not pale.      Findings: No erythema or rash.   Neurological:      Mental Status: She is alert and oriented to person, place, and time.      Cranial Nerves: No cranial nerve deficit.   Psychiatric:         Behavior: Behavior normal.         Procedure   Procedures       Assessment & Plan     Problems Addressed this Visit          Cardiac and Vasculature    Primary hypertension - Primary    Chest pain    PVC's (premature ventricular contractions)     Diagnoses         Codes Comments    Primary hypertension    -  Primary ICD-10-CM: I10  ICD-9-CM: 401.9     PVC's (premature ventricular contractions)     ICD-10-CM: I49.3  ICD-9-CM: 427.69     Chest pain, unspecified type     ICD-10-CM: R07.9  ICD-9-CM: 786.50           Recommendation  1.  Patient is a 73-year-old female that underwent testing which was benign.  She initially had some chest pain and dyspnea but that seems to have improved.  She recently lost her father and speaks about that today.  However, her chest pain has resolved completely and with lower stress test and normal systolic function, feel it is reasonable to continue to monitor without any further intervention unless symptoms change.    2.  Patient with premature beats during the stress test but not symptomatic.  In the setting, with  normal systolic function and no evidence of ischemia, nothing further is indicated.    3.  Patient with baseline hypertension.  She is on losartan.  We can monitor for now.  If this continues to be elevated, we can consider adjusting her medication.  Otherwise, we will see her back for follow-up in a year or sooner if needed.  Follow-up with primary as scheduled.         Patient brought in medicine list to appointment, it's been reviewed with patient and med list was updated in the chart.      Advance Care Planning   ACP discussion was declined by the patient. Patient has an advance directive in EMR which is still valid.       Electronically signed by:

## 2024-03-12 ENCOUNTER — OFFICE VISIT (OUTPATIENT)
Dept: OBSTETRICS AND GYNECOLOGY | Facility: CLINIC | Age: 74
End: 2024-03-12
Payer: MEDICARE

## 2024-03-12 VITALS
SYSTOLIC BLOOD PRESSURE: 128 MMHG | HEIGHT: 64 IN | BODY MASS INDEX: 33.73 KG/M2 | DIASTOLIC BLOOD PRESSURE: 82 MMHG | WEIGHT: 197.6 LBS

## 2024-03-12 DIAGNOSIS — M85.80 OSTEOPENIA AFTER MENOPAUSE: ICD-10-CM

## 2024-03-12 DIAGNOSIS — Z01.419 WOMEN'S ANNUAL ROUTINE GYNECOLOGICAL EXAMINATION: Primary | ICD-10-CM

## 2024-03-12 DIAGNOSIS — Z13.820 OSTEOPOROSIS SCREENING: ICD-10-CM

## 2024-03-12 DIAGNOSIS — F41.9 ANXIETY: ICD-10-CM

## 2024-03-12 DIAGNOSIS — N39.46 URINARY INCONTINENCE, MIXED: ICD-10-CM

## 2024-03-12 DIAGNOSIS — Z90.710 STATUS POST HYSTERECTOMY: ICD-10-CM

## 2024-03-12 DIAGNOSIS — Z12.39 ENCOUNTER FOR BREAST CANCER SCREENING USING NON-MAMMOGRAM MODALITY: ICD-10-CM

## 2024-03-12 DIAGNOSIS — Z78.0 OSTEOPENIA AFTER MENOPAUSE: ICD-10-CM

## 2024-03-12 RX ORDER — IBANDRONATE SODIUM 150 MG/1
150 TABLET, FILM COATED ORAL
Qty: 3 TABLET | Refills: 3 | Status: SHIPPED | OUTPATIENT
Start: 2024-03-12

## 2024-03-12 NOTE — PROGRESS NOTES
Annual Exam         Subjective     HPI  Jazmyne Yang is a 73 y.o. female, , who presents as a(n) established patient. She is postmenopausal. There were no changes to her medical or surgical history since her last visit.. Marital Status: .  She is sexually active. She has not had new partners.. STD testing recommendations have been explained to the patient and she {declines STD testing.    Patient reports problems with: moderate stress and urge urinary incontinence.     Additional OB/GYN History     On HRT? No    Last Pap : 2022. Results: negative. HPV:  not done    Last Completed Pap Smear       This patient has no relevant Health Maintenance data.          History of abnormal Pap smear: no  Family history of uterine, colon, breast, or ovarian cancer: yes - Paternal first cousin ovarian cancer, PA and MA had breast cancer  Performs monthly Self-Breast Exam: yes  Last mammogram: 2024. Done at .    Last Completed Mammogram            MAMMOGRAM (Yearly) Next due on 2024  Mammo Screening Digital Tomosynthesis Bilateral With CAD    2022  Mammo Screening Digital Tomosynthesis Bilateral With CAD    2021  Mammo Screening Digital Tomosynthesis Bilateral With CAD    10/20/2020  Mammo Screening Digital Tomosynthesis Bilateral With CAD    2020  Mammo Diagnostic Digital Tomosynthesis Right With CAD    Only the first 5 history entries have been loaded, but more history exists.                  Last colonoscopy: has had a colonoscopy 4 years ago    Last Completed Colonoscopy       This patient has no relevant Health Maintenance data.          Her last bone density scan was 3/12/2024 ago and results were Normal  Exercises Regularly: no  Feelings of Anxiety or Depression: yes - treated with medication     Tobacco Usage?: No       Current Outpatient Medications:     Cyanocobalamin 1000 MCG/ML liquid, , Disp: , Rfl:     ibandronate (Boniva) 150 MG  tablet, Take 1 tablet by mouth Every 30 (Thirty) Days., Disp: 3 tablet, Rfl: 3    levothyroxine (SYNTHROID), , Disp: , Rfl:     losartan (COZAAR) 50 MG tablet, 2 tablets., Disp: , Rfl:     venlafaxine XR (Effexor XR) 75 MG 24 hr capsule, Take 1 capsule by mouth Daily., Disp: 90 capsule, Rfl: 3    Current Facility-Administered Medications:     lidocaine (XYLOCAINE) 1 % injection 5 mL, 5 mL, Infiltration, Once, Kristine Reed MD    Patient denies the need for medication refills today.    OB History          2    Para   2    Term   2       0    AB   0    Living   2         SAB        IAB        Ectopic        Molar        Multiple        Live Births   2                Past Medical History:   Diagnosis Date    Anxiety     Disease of thyroid gland     Squamous cell cancer of lip 2023        Past Surgical History:   Procedure Laterality Date    BREAST BIOPSY Right 2019    BREAST EXCISIONAL BIOPSY Right 1994    BREAST EXCISIONAL BIOPSY Right 2020    HYSTERECTOMY      AGE 48    OOPHORECTOMY Bilateral     AGE 48    SKIN CANCER EXCISION         Health Maintenance   Topic Date Due    BMI FOLLOWUP  Never done    ZOSTER VACCINE (1 of 2) Never done    RSV Vaccine - Adults (1 - 1-dose 60+ series) Never done    Pneumococcal Vaccine 65+ (1 of 1 - PCV) Never done    HEPATITIS C SCREENING  Never done    ANNUAL WELLNESS VISIT  Never done    COVID-19 Vaccine ( - -24 season) 2023    COLORECTAL CANCER SCREENING  2024    PAP SMEAR  2024    DXA SCAN  2024    MAMMOGRAM  2025    TDAP/TD VACCINES (2 - Td or Tdap) 2027    INFLUENZA VACCINE  Completed       The additional following portions of the patient's history were reviewed and updated as appropriate: allergies, current medications, past family history, past medical history, past social history, past surgical history, and problem list.    Review of Systems   Constitutional: Negative.    HENT: Negative.     Eyes:  "Negative.    Respiratory: Negative.     Cardiovascular: Negative.    Gastrointestinal: Negative.    Endocrine: Negative.    Genitourinary: Negative.    Musculoskeletal: Negative.    Skin: Negative.    Allergic/Immunologic: Negative.    Neurological: Negative.    Hematological: Negative.    Psychiatric/Behavioral: Negative.         I have reviewed and agree with the HPI, ROS, and historical information as entered above. Frida Min MD           Objective   /82   Ht 162.6 cm (64\")   Wt 89.6 kg (197 lb 9.6 oz)   BMI 33.92 kg/m²     Physical Exam  Vitals and nursing note reviewed. Exam conducted with a chaperone present.   Constitutional:       Appearance: She is well-developed.   HENT:      Head: Normocephalic and atraumatic.   Neck:      Thyroid: No thyroid mass or thyromegaly.   Cardiovascular:      Rate and Rhythm: Normal rate and regular rhythm.      Heart sounds: No murmur heard.  Pulmonary:      Effort: Pulmonary effort is normal. No retractions.      Breath sounds: Normal breath sounds. No wheezing, rhonchi or rales.   Chest:      Chest wall: No mass or tenderness.   Breasts:     Right: Normal. No mass, nipple discharge, skin change or tenderness.      Left: Normal. No mass, nipple discharge, skin change or tenderness.   Abdominal:      General: Bowel sounds are normal.      Palpations: Abdomen is soft. Abdomen is not rigid. There is no mass.      Tenderness: There is no abdominal tenderness. There is no guarding.      Hernia: No hernia is present. There is no hernia in the left inguinal area or right inguinal area.   Genitourinary:     General: Normal vulva.      Exam position: Lithotomy position.      Pubic Area: No rash.       Labia:         Right: No rash, tenderness or lesion.         Left: No rash, tenderness or lesion.       Urethra: No urethral pain or urethral swelling.      Vagina: Normal. No vaginal discharge or lesions.      Uterus: Absent.       Adnexa:         Right: No mass, " tenderness or fullness.          Left: No mass, tenderness or fullness.        Rectum: No external hemorrhoid.      Comments: Cervix surgically absent.  Vaginal cuff intact.  Musculoskeletal:      Cervical back: Normal range of motion. No muscular tenderness.   Neurological:      Mental Status: She is alert and oriented to person, place, and time.   Psychiatric:         Behavior: Behavior normal.          Assessment and Plan         Problem List Items Addressed This Visit          Genitourinary and Reproductive     Status post hysterectomy     Other Visit Diagnoses       Women's annual routine gynecological examination    -  Primary    Relevant Orders    LIQUID-BASED PAP SMEAR WITH HPV GENOTYPING IF ASCUS ([facility])    Encounter for breast cancer screening using non-mammogram modality        Osteoporosis screening        Osteopenia after menopause        Relevant Medications    ibandronate (Boniva) 150 MG tablet    Urinary incontinence, mixed        Anxiety        Relevant Medications    venlafaxine XR (Effexor XR) 75 MG 24 hr capsule            Recommended use of Vitamin D replacement and getting adequate calcium in her diet. (1500mg).  BDS reviewed and relatively stable.  Continue Boniva.  Reviewed monthly self breast exams.  Instructed to call with lumps, pain, or breast discharge.    Continue yearly mammography  Reviewed HPV guidelines.  Reviewed exercise as a preventative health measures.   Mixed UI - stable.  Refill of Effexor - doing well on this despite dads recent death.  Return in about 1 year (around 3/12/2025).    Frida Min MD  03/12/2024

## 2024-03-13 RX ORDER — VENLAFAXINE HYDROCHLORIDE 75 MG/1
75 CAPSULE, EXTENDED RELEASE ORAL DAILY
Qty: 90 CAPSULE | Refills: 3 | Status: SHIPPED | OUTPATIENT
Start: 2024-03-13 | End: 2025-03-13

## 2024-03-15 LAB — REF LAB TEST METHOD: NORMAL

## 2024-10-02 ENCOUNTER — TRANSCRIBE ORDERS (OUTPATIENT)
Dept: ADMINISTRATIVE | Facility: HOSPITAL | Age: 74
End: 2024-10-02
Payer: MEDICARE

## 2024-10-02 DIAGNOSIS — Z12.31 VISIT FOR SCREENING MAMMOGRAM: Primary | ICD-10-CM

## 2025-01-22 ENCOUNTER — TRANSCRIBE ORDERS (OUTPATIENT)
Dept: ADMINISTRATIVE | Facility: HOSPITAL | Age: 75
End: 2025-01-22
Payer: MEDICARE

## 2025-01-22 DIAGNOSIS — Z12.31 SCREENING MAMMOGRAM FOR BREAST CANCER: Primary | ICD-10-CM

## 2025-02-10 ENCOUNTER — OFFICE VISIT (OUTPATIENT)
Dept: CARDIOLOGY | Facility: CLINIC | Age: 75
End: 2025-02-10
Payer: MEDICARE

## 2025-02-10 VITALS
WEIGHT: 159 LBS | SYSTOLIC BLOOD PRESSURE: 110 MMHG | OXYGEN SATURATION: 98 % | DIASTOLIC BLOOD PRESSURE: 74 MMHG | HEIGHT: 64 IN | BODY MASS INDEX: 27.14 KG/M2

## 2025-02-10 DIAGNOSIS — I10 PRIMARY HYPERTENSION: Primary | ICD-10-CM

## 2025-02-10 DIAGNOSIS — I49.3 PVC'S (PREMATURE VENTRICULAR CONTRACTIONS): ICD-10-CM

## 2025-02-10 PROCEDURE — 93000 ELECTROCARDIOGRAM COMPLETE: CPT | Performed by: PHYSICIAN ASSISTANT

## 2025-02-10 PROCEDURE — 99213 OFFICE O/P EST LOW 20 MIN: CPT | Performed by: PHYSICIAN ASSISTANT

## 2025-02-10 PROCEDURE — 3074F SYST BP LT 130 MM HG: CPT | Performed by: PHYSICIAN ASSISTANT

## 2025-02-10 PROCEDURE — 3078F DIAST BP <80 MM HG: CPT | Performed by: PHYSICIAN ASSISTANT

## 2025-02-10 NOTE — PROGRESS NOTES
Problem list     Subjective   Jazmyne Yang is a 74 y.o. female     Chief Complaint   Patient presents with    Yearly follow up     Primary htn       HPI    Patient is a 74-year-old female to the office to be evaluated.  Patient underwent testing in 2023 to include stress test which was unremarkable.  Echo suggested normal systolic function with no critical valve disease.    Clinically, she feels remarkable.  She does not experience chest pain or shortness of breath at all.  She does not describe PND or orthopnea.    No complaints of palpitations or dysrhythmic symptoms.  She otherwise has felt remarkably well.      Current Outpatient Medications on File Prior to Visit   Medication Sig Dispense Refill    Cyanocobalamin 1000 MCG/ML liquid       ibandronate (Boniva) 150 MG tablet Take 1 tablet by mouth Every 30 (Thirty) Days. 3 tablet 3    levothyroxine (SYNTHROID)       losartan (COZAAR) 50 MG tablet 2 tablets.      venlafaxine XR (Effexor XR) 75 MG 24 hr capsule Take 1 capsule by mouth Daily. 90 capsule 3     Current Facility-Administered Medications on File Prior to Visit   Medication Dose Route Frequency Provider Last Rate Last Admin    lidocaine (XYLOCAINE) 1 % injection 5 mL  5 mL Infiltration Once Kristine Reed MD           Patient has no known allergies.    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       Social History     Socioeconomic History    Marital status:    Tobacco Use    Smoking status: Never    Smokeless tobacco: Never   Substance and Sexual Activity    Alcohol use: Not Currently    Drug use: Never    Sexual activity: Not Currently     Birth control/protection: Post-menopausal       Family History   Problem Relation Age of Onset    Leukemia Mother     Stomach cancer Mother     Coronary artery disease Father     Transient ischemic attack Father     Diabetes Father     Breast cancer Maternal Aunt 70     "Breast cancer Paternal Aunt         DX AGE 70'S    Breast cancer Paternal Aunt         DX AGE LATE 60'S    Ovarian cancer Paternal Cousin 45    Endometrial cancer Neg Hx     BRCA 1/2 Neg Hx        Review of Systems   Constitutional: Negative.  Negative for activity change, appetite change, chills, fatigue and fever.   HENT: Negative.  Negative for congestion, sinus pressure and sinus pain.    Eyes: Negative.  Negative for visual disturbance.   Respiratory: Negative.  Negative for apnea, cough, chest tightness, shortness of breath and wheezing.    Cardiovascular:  Negative for chest pain, palpitations and leg swelling.   Gastrointestinal: Negative.  Negative for blood in stool.   Endocrine: Negative.  Negative for cold intolerance and heat intolerance.   Genitourinary: Negative.  Negative for hematuria.   Musculoskeletal: Negative.  Negative for gait problem.   Skin: Negative.  Negative for color change, rash and wound.   Allergic/Immunologic: Negative.  Negative for environmental allergies and food allergies.   Neurological:  Positive for dizziness. Negative for syncope, weakness, light-headedness, numbness and headaches.   Hematological: Negative.  Does not bruise/bleed easily.   Psychiatric/Behavioral:  Negative for sleep disturbance.        Objective   Vitals:    02/10/25 0901   BP: 110/74   BP Location: Right arm   Patient Position: Sitting   Cuff Size: Adult   SpO2: 98%   Weight: 72.1 kg (159 lb)   Height: 162.6 cm (64\")      /74 (BP Location: Right arm, Patient Position: Sitting, Cuff Size: Adult)   Ht 162.6 cm (64\")   Wt 72.1 kg (159 lb)   SpO2 98%   BMI 27.29 kg/m²     Lab Results (most recent)       None            Physical Exam  Vitals and nursing note reviewed.   Constitutional:       General: She is not in acute distress.     Appearance: Normal appearance. She is well-developed.   HENT:      Head: Normocephalic and atraumatic.   Eyes:      General: No scleral icterus.        Right eye: No " discharge.         Left eye: No discharge.      Conjunctiva/sclera: Conjunctivae normal.   Neck:      Vascular: No carotid bruit.   Cardiovascular:      Rate and Rhythm: Normal rate and regular rhythm.      Heart sounds: Normal heart sounds. No murmur heard.     No friction rub. No gallop.   Pulmonary:      Effort: Pulmonary effort is normal. No respiratory distress.      Breath sounds: Normal breath sounds. No wheezing or rales.   Chest:      Chest wall: No tenderness.   Musculoskeletal:      Right lower leg: No edema.      Left lower leg: No edema.   Skin:     General: Skin is warm and dry.      Coloration: Skin is not pale.      Findings: No erythema or rash.   Neurological:      Mental Status: She is alert and oriented to person, place, and time.      Cranial Nerves: No cranial nerve deficit.   Psychiatric:         Behavior: Behavior normal.         Procedure     ECG 12 Lead    Date/Time: 2/10/2025 9:02 AM  Performed by: Farshad Cosme PA    Authorized by: Farshad Cosme PA  Comparison: compared with previous ECG from 12/8/2022  Comparison to previous ECG: EKG demonstrates sinus rhythm at 86bpm, borderline 1st degree AV block, with no acute ST changes             Assessment & Plan     Problems Addressed this Visit          Cardiac and Vasculature    Primary hypertension - Primary    PVC's (premature ventricular contractions)     Diagnoses         Codes Comments    Primary hypertension    -  Primary ICD-10-CM: I10  ICD-9-CM: 401.9     PVC's (premature ventricular contractions)     ICD-10-CM: I49.3  ICD-9-CM: 427.69           Recommendations  1.  Patient is a 74-year-old female doing remarkably well.  Patient has lower stress test and normal systolic function.  Patient feels well.  For now, nothing further but continued monitoring.    2.  Patient with baseline hypertension doing well on medical therapy.  Nothing further at this point.    3.  Patient with occasional PVCs in the past with no complaint of  dysrhythmic symptoms at this point.    4.  For now, we can continue to monitor and see her back for follow-up in a year or sooner if needed.  Follow-up with primary as scheduled.         Patient did not bring med list or medicine bottles to appointment, med list has been reviewed and updated based on patient's knowledge of their meds.      Advance Care Planning   ACP discussion was declined by the patient. Patient has an advance directive in EMR which is still valid.       Electronically signed by:

## 2025-02-10 NOTE — LETTER
February 10, 2025     Aissatou Feliz DO  [address] [place] [address] [address]  [address]  [facility] [address]    Patient: Jazmyne Yang   YOB: 1950   Date of Visit: 2/10/2025       Dear Aissatou Feliz DO    Jazmyne Yang was in my office today. Below is a copy of my note.    If you have questions, please do not hesitate to call me. I look forward to following Jazmyne along with you.         Sincerely,        COBY Giagn        CC: No Recipients    Problem list     Subjective  Jazmyne Yang is a 74 y.o. female     Chief Complaint   Patient presents with   • Yearly follow up     Primary htn       HPI    Patient is a 74-year-old female to the office to be evaluated.  Patient underwent testing in 2023 to include stress test which was unremarkable.  Echo suggested normal systolic function with no critical valve disease.    Clinically, she feels remarkable.  She does not experience chest pain or shortness of breath at all.  She does not describe PND or orthopnea.    No complaints of palpitations or dysrhythmic symptoms.  She otherwise has felt remarkably well.      Current Outpatient Medications on File Prior to Visit   Medication Sig Dispense Refill   • Cyanocobalamin 1000 MCG/ML liquid      • ibandronate (Boniva) 150 MG tablet Take 1 tablet by mouth Every 30 (Thirty) Days. 3 tablet 3   • levothyroxine (SYNTHROID)      • losartan (COZAAR) 50 MG tablet 2 tablets.     • venlafaxine XR (Effexor XR) 75 MG 24 hr capsule Take 1 capsule by mouth Daily. 90 capsule 3     Current Facility-Administered Medications on File Prior to Visit   Medication Dose Route Frequency Provider Last Rate Last Admin   • lidocaine (XYLOCAINE) 1 % injection 5 mL  5 mL Infiltration Once Kristine Reed MD           Patient has no known allergies.    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       Social History     Socioeconomic History   •  "Marital status:    Tobacco Use   • Smoking status: Never   • Smokeless tobacco: Never   Substance and Sexual Activity   • Alcohol use: Not Currently   • Drug use: Never   • Sexual activity: Not Currently     Birth control/protection: Post-menopausal       Family History   Problem Relation Age of Onset   • Leukemia Mother    • Stomach cancer Mother    • Coronary artery disease Father    • Transient ischemic attack Father    • Diabetes Father    • Breast cancer Maternal Aunt 70   • Breast cancer Paternal Aunt         DX AGE 70'S   • Breast cancer Paternal Aunt         DX AGE LATE 60'S   • Ovarian cancer Paternal Cousin 45   • Endometrial cancer Neg Hx    • BRCA 1/2 Neg Hx        Review of Systems   Constitutional: Negative.  Negative for activity change, appetite change, chills, fatigue and fever.   HENT: Negative.  Negative for congestion, sinus pressure and sinus pain.    Eyes: Negative.  Negative for visual disturbance.   Respiratory: Negative.  Negative for apnea, cough, chest tightness, shortness of breath and wheezing.    Cardiovascular:  Negative for chest pain, palpitations and leg swelling.   Gastrointestinal: Negative.  Negative for blood in stool.   Endocrine: Negative.  Negative for cold intolerance and heat intolerance.   Genitourinary: Negative.  Negative for hematuria.   Musculoskeletal: Negative.  Negative for gait problem.   Skin: Negative.  Negative for color change, rash and wound.   Allergic/Immunologic: Negative.  Negative for environmental allergies and food allergies.   Neurological:  Positive for dizziness. Negative for syncope, weakness, light-headedness, numbness and headaches.   Hematological: Negative.  Does not bruise/bleed easily.   Psychiatric/Behavioral:  Negative for sleep disturbance.        Objective  Vitals:    02/10/25 0901   BP: 110/74   BP Location: Right arm   Patient Position: Sitting   Cuff Size: Adult   SpO2: 98%   Weight: 72.1 kg (159 lb)   Height: 162.6 cm (64\")    " "  /74 (BP Location: Right arm, Patient Position: Sitting, Cuff Size: Adult)   Ht 162.6 cm (64\")   Wt 72.1 kg (159 lb)   SpO2 98%   BMI 27.29 kg/m²     Lab Results (most recent)       None            Physical Exam  Vitals and nursing note reviewed.   Constitutional:       General: She is not in acute distress.     Appearance: Normal appearance. She is well-developed.   HENT:      Head: Normocephalic and atraumatic.   Eyes:      General: No scleral icterus.        Right eye: No discharge.         Left eye: No discharge.      Conjunctiva/sclera: Conjunctivae normal.   Neck:      Vascular: No carotid bruit.   Cardiovascular:      Rate and Rhythm: Normal rate and regular rhythm.      Heart sounds: Normal heart sounds. No murmur heard.     No friction rub. No gallop.   Pulmonary:      Effort: Pulmonary effort is normal. No respiratory distress.      Breath sounds: Normal breath sounds. No wheezing or rales.   Chest:      Chest wall: No tenderness.   Musculoskeletal:      Right lower leg: No edema.      Left lower leg: No edema.   Skin:     General: Skin is warm and dry.      Coloration: Skin is not pale.      Findings: No erythema or rash.   Neurological:      Mental Status: She is alert and oriented to person, place, and time.      Cranial Nerves: No cranial nerve deficit.   Psychiatric:         Behavior: Behavior normal.         Procedure    ECG 12 Lead    Date/Time: 2/10/2025 9:02 AM  Performed by: Farshad Cosme PA    Authorized by: Farshad Cosme PA  Comparison: compared with previous ECG from 12/8/2022  Comparison to previous ECG: EKG demonstrates sinus rhythm at 86bpm, borderline 1st degree AV block, with no acute ST changes             Assessment & Plan    Problems Addressed this Visit          Cardiac and Vasculature    Primary hypertension - Primary    PVC's (premature ventricular contractions)     Diagnoses         Codes Comments    Primary hypertension    -  Primary ICD-10-CM: I10  ICD-9-CM: " 401.9     PVC's (premature ventricular contractions)     ICD-10-CM: I49.3  ICD-9-CM: 427.69           Recommendations  1.  Patient is a 74-year-old female doing remarkably well.  Patient has lower stress test and normal systolic function.  Patient feels well.  For now, nothing further but continued monitoring.    2.  Patient with baseline hypertension doing well on medical therapy.  Nothing further at this point.    3.  Patient with occasional PVCs in the past with no complaint of dysrhythmic symptoms at this point.    4.  For now, we can continue to monitor and see her back for follow-up in a year or sooner if needed.  Follow-up with primary as scheduled.         Patient did not bring med list or medicine bottles to appointment, med list has been reviewed and updated based on patient's knowledge of their meds.      Advance Care Planning  ACP discussion was declined by the patient. Patient has an advance directive in EMR which is still valid.       Electronically signed by:

## 2025-03-04 LAB
NCCN CRITERIA FLAG: NORMAL
TYRER CUZICK SCORE: 16

## 2025-03-19 ENCOUNTER — HOSPITAL ENCOUNTER (OUTPATIENT)
Dept: MAMMOGRAPHY | Facility: HOSPITAL | Age: 75
Discharge: HOME | End: 2025-03-19
Admitting: OBSTETRICS & GYNECOLOGY
Payer: MEDICARE

## 2025-03-19 ENCOUNTER — OFFICE VISIT (OUTPATIENT)
Dept: OBSTETRICS AND GYNECOLOGY | Facility: CLINIC | Age: 75
End: 2025-03-19
Payer: MEDICARE

## 2025-03-19 VITALS — WEIGHT: 197.4 LBS | BODY MASS INDEX: 33.88 KG/M2 | DIASTOLIC BLOOD PRESSURE: 88 MMHG | SYSTOLIC BLOOD PRESSURE: 140 MMHG

## 2025-03-19 DIAGNOSIS — Z90.710 STATUS POST HYSTERECTOMY: ICD-10-CM

## 2025-03-19 DIAGNOSIS — Z12.31 SCREENING MAMMOGRAM FOR BREAST CANCER: ICD-10-CM

## 2025-03-19 DIAGNOSIS — M81.0 AGE-RELATED OSTEOPOROSIS WITHOUT CURRENT PATHOLOGICAL FRACTURE: ICD-10-CM

## 2025-03-19 DIAGNOSIS — Z12.39 ENCOUNTER FOR BREAST CANCER SCREENING USING NON-MAMMOGRAM MODALITY: ICD-10-CM

## 2025-03-19 DIAGNOSIS — Z12.31 ENCOUNTER FOR SCREENING MAMMOGRAM FOR MALIGNANT NEOPLASM OF BREAST: ICD-10-CM

## 2025-03-19 DIAGNOSIS — Z78.0 OSTEOPENIA AFTER MENOPAUSE: ICD-10-CM

## 2025-03-19 DIAGNOSIS — M85.80 OSTEOPENIA AFTER MENOPAUSE: ICD-10-CM

## 2025-03-19 DIAGNOSIS — Z80.3 FAMILY HISTORY OF BREAST CANCER: ICD-10-CM

## 2025-03-19 DIAGNOSIS — Z78.0 POSTMENOPAUSAL STATUS: Primary | ICD-10-CM

## 2025-03-19 PROCEDURE — 77063 BREAST TOMOSYNTHESIS BI: CPT

## 2025-03-19 PROCEDURE — 77067 SCR MAMMO BI INCL CAD: CPT

## 2025-03-19 RX ORDER — LEVOTHYROXINE SODIUM 75 UG/1
75 TABLET ORAL
COMMUNITY

## 2025-03-19 RX ORDER — OMEGA-3S/DHA/EPA/FISH OIL/D3 300MG-1000
400 CAPSULE ORAL DAILY
COMMUNITY

## 2025-03-19 NOTE — PROGRESS NOTES
Gyn Postmenopausal exam           CC - Here for postmenopausal exam.        HPI  Jazmyne Yang is a 74 y.o. female, , who presents for postmenopausal  woman exam as a established patient.  She is s/p STACIE/BSO at age 48. Denies vaginal bleeding.   There were no changes to her medical or surgical history since her last visit. Marital Status: .  She is not currently sexually active. STD testing recommendations have been explained to the patient and she declines STD testing.    The patient would like to discuss the following complaints today: none    Additional OB/GYN History   On HRT? No    Last Pap : 3/12/2024. Results: negative. HPV: not done.  Last Completed Pap Smear            Upcoming       PAP SMEAR (Every 2 Years) Next due on 3/12/2026      2024  LIQUID-BASED PAP SMEAR WITH HPV GENOTYPING IF ASCUS ([facility])    2022  SCANNED - PAP SMEAR    2019  Done - Negative                          History of abnormal Pap smear: no  Family history of uterine, colon, breast, or ovarian cancer: yes - Breast CA- Maternal aunt, Paternal aunt x 2, Ovarian CA - paternal cousin  Performs monthly Self-Breast Exam: yes  Last mammogram: 2024. Done at [facility]. There is a copy in the chart.    Last Completed Mammogram            Awaiting Completion       MAMMOGRAM (Every 2 Years) Order placed this encounter      2025  Order placed for Mammo Screening Digital Tomosynthesis Bilateral With CAD by Haven Leal RegSched Rep    2024  Mammo Screening Digital Tomosynthesis Bilateral With CAD    2022  Mammo Screening Digital Tomosynthesis Bilateral With CAD    2021  Mammo Screening Digital Tomosynthesis Bilateral With CAD    10/20/2020  Mammo Screening Digital Tomosynthesis Bilateral With CAD     Only the first 5 history entries have been loaded, but more history exists.                        Last colonoscopy: has had a colonoscopy 4 years ago, Negative    Last  Completed Colonoscopy            Needs Review       COLORECTAL CANCER SCREENING (COLONOSCOPY - Every 10 Years) Tentatively due on 2021  Outside Procedure: COLONOSCOPY    2014  COLONOSCOPY (Done - normal)                            Her last bone density scan was 1 year ago and results were Osteoporosis  Exercises Regularly: no  Feelings of Anxiety or Depression: no      Tobacco Usage?: No       Current Outpatient Medications:     Cyanocobalamin 1000 MCG/ML liquid, , Disp: , Rfl:     ibandronate (Boniva) 150 MG tablet, Take 1 tablet by mouth Every 30 (Thirty) Days., Disp: 3 tablet, Rfl: 3    levothyroxine (SYNTHROID, LEVOTHROID) 75 MCG tablet, Take 1 tablet by mouth Every Morning., Disp: , Rfl:     losartan (COZAAR) 50 MG tablet, 2 tablets., Disp: , Rfl:     venlafaxine XR (Effexor XR) 75 MG 24 hr capsule, Take 1 capsule by mouth Daily., Disp: 90 capsule, Rfl: 3    Cholecalciferol 10 MCG (400 UNIT) tablet, Take 1 tablet by mouth Daily., Disp: , Rfl:     Current Facility-Administered Medications:     lidocaine (XYLOCAINE) 1 % injection 5 mL, 5 mL, Infiltration, Once, Kristine Reed MD    Patient denies the need for medication refills today.    OB History          2    Para   2    Term   2       0    AB   0    Living   2         SAB        IAB        Ectopic        Molar        Multiple        Live Births   2                Past Medical History:   Diagnosis Date    Anxiety     Disease of thyroid gland     Hypertension     Squamous cell cancer of lip 2023        Past Surgical History:   Procedure Laterality Date    BREAST BIOPSY Right 2019    BREAST EXCISIONAL BIOPSY Right 1994    BREAST EXCISIONAL BIOPSY Right 2020    HYSTERECTOMY      AGE 48    OOPHORECTOMY Bilateral     AGE 48    SKIN CANCER EXCISION         Health Maintenance   Topic Date Due    BMI FOLLOWUP  Never done    Pneumococcal Vaccine 50+ (1 of 1 - PCV) Never done    ZOSTER VACCINE (1 of 2) Never  done    HEPATITIS C SCREENING  Never done    ANNUAL WELLNESS VISIT  Never done    COVID-19 Vaccine (5 - 2024-25 season) 09/01/2024    MAMMOGRAM  01/17/2026    PAP SMEAR  03/12/2026    DXA SCAN  03/12/2026    MOST FORM  03/19/2026    COLORECTAL CANCER SCREENING  05/13/2031    TDAP/TD VACCINES (3 - Td or Tdap) 09/30/2034    INFLUENZA VACCINE  Completed       The additional following portions of the patient's history were reviewed and updated as appropriate: allergies, current medications, past family history, past medical history, past social history, past surgical history, and problem list.    Review of Systems   Constitutional: Negative.    HENT: Negative.     Eyes: Negative.    Respiratory: Negative.     Cardiovascular: Negative.    Gastrointestinal: Negative.    Endocrine: Negative.    Genitourinary: Negative.    Musculoskeletal: Negative.    Skin: Negative.    Allergic/Immunologic: Negative.    Neurological: Negative.    Hematological: Negative.    Psychiatric/Behavioral: Negative.         I have reviewed and agree with the HPI, ROS, and historical information as entered above. Frida Min MD      Objective   /88   Wt 89.5 kg (197 lb 6.4 oz)   BMI 33.88 kg/m²     Physical Exam  Vitals and nursing note reviewed. Exam conducted with a chaperone present.   Constitutional:       Appearance: She is well-developed.   HENT:      Head: Normocephalic and atraumatic.   Neck:      Thyroid: No thyroid mass or thyromegaly.   Cardiovascular:      Rate and Rhythm: Normal rate and regular rhythm.      Heart sounds: No murmur heard.  Pulmonary:      Effort: Pulmonary effort is normal. No retractions.      Breath sounds: Normal breath sounds. No wheezing, rhonchi or rales.   Chest:      Chest wall: No mass or tenderness.   Breasts:     Right: Normal. No mass, nipple discharge, skin change or tenderness.      Left: Normal. No mass, nipple discharge, skin change or tenderness.   Abdominal:      General: Bowel sounds are  normal.      Palpations: Abdomen is soft. Abdomen is not rigid. There is no mass.      Tenderness: There is no abdominal tenderness. There is no guarding.      Hernia: No hernia is present. There is no hernia in the left inguinal area or right inguinal area.   Genitourinary:     General: Normal vulva.      Exam position: Lithotomy position.      Pubic Area: No rash.       Labia:         Right: No rash, tenderness or lesion.         Left: No rash, tenderness or lesion.       Urethra: No urethral pain or urethral swelling.      Vagina: Normal. No vaginal discharge or lesions.      Uterus: Absent.       Adnexa:         Right: No mass, tenderness or fullness.          Left: No mass, tenderness or fullness.        Rectum: No external hemorrhoid.      Comments: Cervix surgically absent.  Vaginal cuff intact.  Musculoskeletal:      Cervical back: Normal range of motion. No muscular tenderness.   Neurological:      Mental Status: She is alert and oriented to person, place, and time.   Psychiatric:         Behavior: Behavior normal.            Assessment and Plan    Problem List Items Addressed This Visit          Genitourinary and Reproductive     Status post hysterectomy     Other Visit Diagnoses         Postmenopausal status    -  Primary      Osteopenia after menopause        Relevant Medications    ibandronate (Boniva) 150 MG tablet    Other Relevant Orders    DEXA Bone Density Axial      Age-related osteoporosis without current pathological fracture          Family history of breast cancer          Encounter for breast cancer screening using non-mammogram modality        Relevant Orders    Mammo Screening Digital Tomosynthesis Bilateral With CAD      Encounter for screening mammogram for malignant neoplasm of breast        Relevant Orders    Mammo Screening Digital Tomosynthesis Bilateral With CAD            GYN postmenopausal exam.   Reviewed self breast awareness.  Instructed to call with lumps, pain, or breast  discharge.     Osteoporosis - continue Boniva.  Up to date on colon cancer screening.    Return in about 1 year (around 3/19/2026), or with BDS.         Frida Min MD  03/19/2025

## 2025-03-21 RX ORDER — IBANDRONATE SODIUM 150 MG/1
150 TABLET, FILM COATED ORAL
Qty: 3 TABLET | Refills: 3 | Status: SHIPPED | OUTPATIENT
Start: 2025-03-21